# Patient Record
Sex: MALE | URBAN - METROPOLITAN AREA
[De-identification: names, ages, dates, MRNs, and addresses within clinical notes are randomized per-mention and may not be internally consistent; named-entity substitution may affect disease eponyms.]

---

## 2024-01-01 ENCOUNTER — APPOINTMENT (OUTPATIENT)
Dept: RADIOLOGY | Facility: MEDICAL CENTER | Age: 34
DRG: 955 | End: 2024-01-01
Attending: NURSE PRACTITIONER

## 2024-01-01 ENCOUNTER — HOSPITAL ENCOUNTER (OUTPATIENT)
Dept: RADIOLOGY | Facility: MEDICAL CENTER | Age: 34
End: 2024-11-15

## 2024-01-01 ENCOUNTER — APPOINTMENT (OUTPATIENT)
Dept: RADIOLOGY | Facility: MEDICAL CENTER | Age: 34
DRG: 955 | End: 2024-01-01
Attending: STUDENT IN AN ORGANIZED HEALTH CARE EDUCATION/TRAINING PROGRAM

## 2024-01-01 ENCOUNTER — HOSPITAL ENCOUNTER (INPATIENT)
Facility: MEDICAL CENTER | Age: 34
LOS: 6 days | DRG: 955 | End: 2024-11-21
Attending: EMERGENCY MEDICINE | Admitting: STUDENT IN AN ORGANIZED HEALTH CARE EDUCATION/TRAINING PROGRAM

## 2024-01-01 ENCOUNTER — APPOINTMENT (OUTPATIENT)
Dept: RADIOLOGY | Facility: MEDICAL CENTER | Age: 34
DRG: 955 | End: 2024-01-01

## 2024-01-01 ENCOUNTER — APPOINTMENT (OUTPATIENT)
Dept: RADIOLOGY | Facility: MEDICAL CENTER | Age: 34
DRG: 955 | End: 2024-01-01
Attending: EMERGENCY MEDICINE

## 2024-01-01 ENCOUNTER — APPOINTMENT (OUTPATIENT)
Dept: CARDIOLOGY | Facility: MEDICAL CENTER | Age: 34
DRG: 955 | End: 2024-01-01
Attending: STUDENT IN AN ORGANIZED HEALTH CARE EDUCATION/TRAINING PROGRAM

## 2024-01-01 VITALS
OXYGEN SATURATION: 83 % | SYSTOLIC BLOOD PRESSURE: 87 MMHG | BODY MASS INDEX: 31.89 KG/M2 | WEIGHT: 198.41 LBS | HEIGHT: 66 IN | DIASTOLIC BLOOD PRESSURE: 52 MMHG | RESPIRATION RATE: 12 BRPM | TEMPERATURE: 102 F

## 2024-01-01 DIAGNOSIS — S06.2XAA TRAUMATIC INTRACRANIAL HEMORRHAGE WITH UNKNOWN LOSS OF CONSCIOUSNESS STATUS, INITIAL ENCOUNTER (HCC): ICD-10-CM

## 2024-01-01 LAB
ABO + RH BLD: NORMAL
ABO GROUP BLD: NORMAL
ALBUMIN SERPL BCP-MCNC: 2.6 G/DL (ref 3.2–4.9)
ALBUMIN SERPL BCP-MCNC: 2.8 G/DL (ref 3.2–4.9)
ALBUMIN SERPL BCP-MCNC: 2.8 G/DL (ref 3.2–4.9)
ALBUMIN SERPL BCP-MCNC: 2.9 G/DL (ref 3.2–4.9)
ALBUMIN SERPL BCP-MCNC: 3.5 G/DL (ref 3.2–4.9)
ALBUMIN SERPL BCP-MCNC: 3.9 G/DL (ref 3.2–4.9)
ALBUMIN/GLOB SERPL: 0.7 G/DL
ALBUMIN/GLOB SERPL: 0.7 G/DL
ALBUMIN/GLOB SERPL: 0.8 G/DL
ALBUMIN/GLOB SERPL: 0.9 G/DL
ALBUMIN/GLOB SERPL: 0.9 G/DL
ALBUMIN/GLOB SERPL: 1.2 G/DL
ALBUMIN/GLOB SERPL: 1.3 G/DL
ALBUMIN/GLOB SERPL: 1.6 G/DL
ALP SERPL-CCNC: 102 U/L (ref 30–99)
ALP SERPL-CCNC: 121 U/L (ref 30–99)
ALP SERPL-CCNC: 122 U/L (ref 30–99)
ALP SERPL-CCNC: 168 U/L (ref 30–99)
ALP SERPL-CCNC: 77 U/L (ref 30–99)
ALP SERPL-CCNC: 81 U/L (ref 30–99)
ALP SERPL-CCNC: 87 U/L (ref 30–99)
ALP SERPL-CCNC: 97 U/L (ref 30–99)
ALT SERPL-CCNC: 102 U/L (ref 2–50)
ALT SERPL-CCNC: 47 U/L (ref 2–50)
ALT SERPL-CCNC: 48 U/L (ref 2–50)
ALT SERPL-CCNC: 53 U/L (ref 2–50)
ALT SERPL-CCNC: 59 U/L (ref 2–50)
ALT SERPL-CCNC: 60 U/L (ref 2–50)
ALT SERPL-CCNC: 73 U/L (ref 2–50)
ALT SERPL-CCNC: 87 U/L (ref 2–50)
AMPHET UR QL SCN: NEGATIVE
ANION GAP SERPL CALC-SCNC: 10 MMOL/L (ref 7–16)
ANION GAP SERPL CALC-SCNC: 11 MMOL/L (ref 7–16)
ANION GAP SERPL CALC-SCNC: 11 MMOL/L (ref 7–16)
ANION GAP SERPL CALC-SCNC: 15 MMOL/L (ref 7–16)
ANION GAP SERPL CALC-SCNC: 5 MMOL/L (ref 7–16)
ANION GAP SERPL CALC-SCNC: 7 MMOL/L (ref 7–16)
ANION GAP SERPL CALC-SCNC: 8 MMOL/L (ref 7–16)
ANION GAP SERPL CALC-SCNC: 9 MMOL/L (ref 7–16)
ANISOCYTOSIS BLD QL SMEAR: ABNORMAL
APTT PPP: 26.9 SEC (ref 24.7–36)
APTT PPP: 29.5 SEC (ref 24.7–36)
ARTERIAL PATENCY WRIST A: ABNORMAL
AST SERPL-CCNC: 122 U/L (ref 12–45)
AST SERPL-CCNC: 158 U/L (ref 12–45)
AST SERPL-CCNC: 174 U/L (ref 12–45)
AST SERPL-CCNC: 36 U/L (ref 12–45)
AST SERPL-CCNC: 44 U/L (ref 12–45)
AST SERPL-CCNC: 65 U/L (ref 12–45)
AST SERPL-CCNC: 72 U/L (ref 12–45)
AST SERPL-CCNC: 73 U/L (ref 12–45)
BARBITURATES UR QL SCN: NEGATIVE
BASE EXCESS BLDA CALC-SCNC: -1 MMOL/L (ref -4–3)
BASE EXCESS BLDA CALC-SCNC: -10 MMOL/L (ref -4–3)
BASE EXCESS BLDA CALC-SCNC: -2 MMOL/L (ref -4–3)
BASE EXCESS BLDA CALC-SCNC: -2 MMOL/L (ref -4–3)
BASE EXCESS BLDA CALC-SCNC: -3 MMOL/L (ref -4–3)
BASE EXCESS BLDA CALC-SCNC: -6 MMOL/L (ref -4–3)
BASE EXCESS BLDA CALC-SCNC: -7 MMOL/L (ref -4–3)
BASE EXCESS BLDA CALC-SCNC: 0 MMOL/L (ref -4–3)
BASE EXCESS BLDA CALC-SCNC: 0 MMOL/L (ref -4–3)
BASOPHILS # BLD AUTO: 0 % (ref 0–1.8)
BASOPHILS # BLD AUTO: 0 % (ref 0–1.8)
BASOPHILS # BLD AUTO: 0.1 % (ref 0–1.8)
BASOPHILS # BLD AUTO: 0.2 % (ref 0–1.8)
BASOPHILS # BLD AUTO: 0.9 % (ref 0–1.8)
BASOPHILS # BLD AUTO: 0.9 % (ref 0–1.8)
BASOPHILS # BLD AUTO: 2.5 % (ref 0–1.8)
BASOPHILS # BLD: 0 K/UL (ref 0–0.12)
BASOPHILS # BLD: 0 K/UL (ref 0–0.12)
BASOPHILS # BLD: 0.02 K/UL (ref 0–0.12)
BASOPHILS # BLD: 0.02 K/UL (ref 0–0.12)
BASOPHILS # BLD: 0.08 K/UL (ref 0–0.12)
BASOPHILS # BLD: 0.1 K/UL (ref 0–0.12)
BASOPHILS # BLD: 0.19 K/UL (ref 0–0.12)
BENZODIAZ UR QL SCN: POSITIVE
BILIRUB SERPL-MCNC: 0.5 MG/DL (ref 0.1–1.5)
BILIRUB SERPL-MCNC: 0.6 MG/DL (ref 0.1–1.5)
BILIRUB SERPL-MCNC: 0.6 MG/DL (ref 0.1–1.5)
BILIRUB SERPL-MCNC: 0.7 MG/DL (ref 0.1–1.5)
BILIRUB SERPL-MCNC: 0.7 MG/DL (ref 0.1–1.5)
BILIRUB SERPL-MCNC: 1.5 MG/DL (ref 0.1–1.5)
BILIRUB SERPL-MCNC: 1.8 MG/DL (ref 0.1–1.5)
BILIRUB SERPL-MCNC: 3.2 MG/DL (ref 0.1–1.5)
BLD GP AB SCN SERPL QL: NORMAL
BODY TEMPERATURE: 37.7 CENTIGRADE
BODY TEMPERATURE: ABNORMAL DEGREES
BREATHS SETTING VENT: 22
BREATHS SETTING VENT: 32
BREATHS SETTING VENT: 32
BREATHS SETTING VENT: 34
BUN SERPL-MCNC: 10 MG/DL (ref 8–22)
BUN SERPL-MCNC: 11 MG/DL (ref 8–22)
BUN SERPL-MCNC: 13 MG/DL (ref 8–22)
BUN SERPL-MCNC: 14 MG/DL (ref 8–22)
BUN SERPL-MCNC: 15 MG/DL (ref 8–22)
BUN SERPL-MCNC: 16 MG/DL (ref 8–22)
BUN SERPL-MCNC: 16 MG/DL (ref 8–22)
BUN SERPL-MCNC: 18 MG/DL (ref 8–22)
BUN SERPL-MCNC: 19 MG/DL (ref 8–22)
BUN SERPL-MCNC: 19 MG/DL (ref 8–22)
BUN SERPL-MCNC: 20 MG/DL (ref 8–22)
BUN SERPL-MCNC: 22 MG/DL (ref 8–22)
BUN SERPL-MCNC: 22 MG/DL (ref 8–22)
BUN SERPL-MCNC: 23 MG/DL (ref 8–22)
BUN SERPL-MCNC: 23 MG/DL (ref 8–22)
BUN SERPL-MCNC: 25 MG/DL (ref 8–22)
BUN SERPL-MCNC: 25 MG/DL (ref 8–22)
BUN SERPL-MCNC: 26 MG/DL (ref 8–22)
BUN SERPL-MCNC: 27 MG/DL (ref 8–22)
BUN SERPL-MCNC: 28 MG/DL (ref 8–22)
BUN SERPL-MCNC: 32 MG/DL (ref 8–22)
BUN SERPL-MCNC: 39 MG/DL (ref 8–22)
BUN SERPL-MCNC: 40 MG/DL (ref 8–22)
BUN SERPL-MCNC: 6 MG/DL (ref 8–22)
BUN SERPL-MCNC: 7 MG/DL (ref 8–22)
BUN SERPL-MCNC: 9 MG/DL (ref 8–22)
BZE UR QL SCN: NEGATIVE
CA-I BLD ISE-SCNC: 1.33 MMOL/L (ref 1.1–1.3)
CALCIUM ALBUM COR SERPL-MCNC: 10 MG/DL (ref 8.5–10.5)
CALCIUM ALBUM COR SERPL-MCNC: 10.4 MG/DL (ref 8.5–10.5)
CALCIUM ALBUM COR SERPL-MCNC: 8.5 MG/DL (ref 8.5–10.5)
CALCIUM ALBUM COR SERPL-MCNC: 8.5 MG/DL (ref 8.5–10.5)
CALCIUM ALBUM COR SERPL-MCNC: 9 MG/DL (ref 8.5–10.5)
CALCIUM ALBUM COR SERPL-MCNC: 9.1 MG/DL (ref 8.5–10.5)
CALCIUM ALBUM COR SERPL-MCNC: 9.5 MG/DL (ref 8.5–10.5)
CALCIUM ALBUM COR SERPL-MCNC: 9.6 MG/DL (ref 8.5–10.5)
CALCIUM SERPL-MCNC: 7.4 MG/DL (ref 8.5–10.5)
CALCIUM SERPL-MCNC: 7.4 MG/DL (ref 8.5–10.5)
CALCIUM SERPL-MCNC: 7.7 MG/DL (ref 8.5–10.5)
CALCIUM SERPL-MCNC: 7.7 MG/DL (ref 8.5–10.5)
CALCIUM SERPL-MCNC: 7.8 MG/DL (ref 8.5–10.5)
CALCIUM SERPL-MCNC: 7.8 MG/DL (ref 8.5–10.5)
CALCIUM SERPL-MCNC: 7.9 MG/DL (ref 8.5–10.5)
CALCIUM SERPL-MCNC: 8 MG/DL (ref 8.5–10.5)
CALCIUM SERPL-MCNC: 8.1 MG/DL (ref 8.5–10.5)
CALCIUM SERPL-MCNC: 8.1 MG/DL (ref 8.5–10.5)
CALCIUM SERPL-MCNC: 8.2 MG/DL (ref 8.5–10.5)
CALCIUM SERPL-MCNC: 8.3 MG/DL (ref 8.5–10.5)
CALCIUM SERPL-MCNC: 8.4 MG/DL (ref 8.5–10.5)
CALCIUM SERPL-MCNC: 8.5 MG/DL (ref 8.5–10.5)
CALCIUM SERPL-MCNC: 8.6 MG/DL (ref 8.5–10.5)
CALCIUM SERPL-MCNC: 8.7 MG/DL (ref 8.5–10.5)
CALCIUM SERPL-MCNC: 8.8 MG/DL (ref 8.5–10.5)
CALCIUM SERPL-MCNC: 9 MG/DL (ref 8.5–10.5)
CALCIUM SERPL-MCNC: 9.1 MG/DL (ref 8.5–10.5)
CALCIUM SERPL-MCNC: 9.3 MG/DL (ref 8.5–10.5)
CALCIUM SERPL-MCNC: 9.5 MG/DL (ref 8.5–10.5)
CANNABINOIDS UR QL SCN: NEGATIVE
CFT BLD TEG: 4.7 MIN (ref 4.6–9.1)
CFT BLD TEG: 9.9 MIN (ref 4.6–9.1)
CFT P HPASE BLD TEG: 5.1 MIN (ref 4.3–8.3)
CFT P HPASE BLD TEG: 8.9 MIN (ref 4.3–8.3)
CHLORIDE SERPL-SCNC: 105 MMOL/L (ref 96–112)
CHLORIDE SERPL-SCNC: 112 MMOL/L (ref 96–112)
CHLORIDE SERPL-SCNC: 114 MMOL/L (ref 96–112)
CHLORIDE SERPL-SCNC: 115 MMOL/L (ref 96–112)
CHLORIDE SERPL-SCNC: 118 MMOL/L (ref 96–112)
CHLORIDE SERPL-SCNC: 118 MMOL/L (ref 96–112)
CHLORIDE SERPL-SCNC: 119 MMOL/L (ref 96–112)
CHLORIDE SERPL-SCNC: 120 MMOL/L (ref 96–112)
CHLORIDE SERPL-SCNC: 121 MMOL/L (ref 96–112)
CHLORIDE SERPL-SCNC: 122 MMOL/L (ref 96–112)
CHLORIDE SERPL-SCNC: 123 MMOL/L (ref 96–112)
CHLORIDE SERPL-SCNC: 124 MMOL/L (ref 96–112)
CHLORIDE SERPL-SCNC: 125 MMOL/L (ref 96–112)
CHLORIDE SERPL-SCNC: 126 MMOL/L (ref 96–112)
CHLORIDE SERPL-SCNC: 127 MMOL/L (ref 96–112)
CHLORIDE SERPL-SCNC: 127 MMOL/L (ref 96–112)
CHLORIDE SERPL-SCNC: 128 MMOL/L (ref 96–112)
CLOT ANGLE BLD TEG: 72.8 DEGREES (ref 63–78)
CLOT ANGLE BLD TEG: 72.9 DEGREES (ref 63–78)
CLOT LYSIS 30M P MA LENFR BLD TEG: 0 % (ref 0–2.6)
CO2 BLDA-SCNC: 20 MMOL/L (ref 20–33)
CO2 BLDA-SCNC: 20 MMOL/L (ref 20–33)
CO2 BLDA-SCNC: 23 MMOL/L (ref 32–48)
CO2 BLDA-SCNC: 25 MMOL/L (ref 32–48)
CO2 BLDA-SCNC: 26 MMOL/L (ref 32–48)
CO2 BLDA-SCNC: 28 MMOL/L (ref 32–48)
CO2 BLDA-SCNC: 29 MMOL/L (ref 32–48)
CO2 BLDA-SCNC: 30 MMOL/L (ref 32–48)
CO2 BLDA-SCNC: 30 MMOL/L (ref 32–48)
CO2 SERPL-SCNC: 17 MMOL/L (ref 20–33)
CO2 SERPL-SCNC: 17 MMOL/L (ref 20–33)
CO2 SERPL-SCNC: 18 MMOL/L (ref 20–33)
CO2 SERPL-SCNC: 19 MMOL/L (ref 20–33)
CO2 SERPL-SCNC: 20 MMOL/L (ref 20–33)
CO2 SERPL-SCNC: 21 MMOL/L (ref 20–33)
CO2 SERPL-SCNC: 22 MMOL/L (ref 20–33)
CO2 SERPL-SCNC: 23 MMOL/L (ref 20–33)
CO2 SERPL-SCNC: 24 MMOL/L (ref 20–33)
CO2 SERPL-SCNC: 24 MMOL/L (ref 20–33)
CO2 SERPL-SCNC: 26 MMOL/L (ref 20–33)
COMPONENT CELLULAR 8504CLL: NORMAL
CORTIS SERPL-MCNC: 32.1 UG/DL (ref 0–23)
CREAT SERPL-MCNC: 0.24 MG/DL (ref 0.5–1.4)
CREAT SERPL-MCNC: 0.3 MG/DL (ref 0.5–1.4)
CREAT SERPL-MCNC: 0.32 MG/DL (ref 0.5–1.4)
CREAT SERPL-MCNC: 0.39 MG/DL (ref 0.5–1.4)
CREAT SERPL-MCNC: 0.4 MG/DL (ref 0.5–1.4)
CREAT SERPL-MCNC: 0.43 MG/DL (ref 0.5–1.4)
CREAT SERPL-MCNC: 0.43 MG/DL (ref 0.5–1.4)
CREAT SERPL-MCNC: 0.46 MG/DL (ref 0.5–1.4)
CREAT SERPL-MCNC: 0.47 MG/DL (ref 0.5–1.4)
CREAT SERPL-MCNC: 0.49 MG/DL (ref 0.5–1.4)
CREAT SERPL-MCNC: 0.5 MG/DL (ref 0.5–1.4)
CREAT SERPL-MCNC: 0.54 MG/DL (ref 0.5–1.4)
CREAT SERPL-MCNC: 0.55 MG/DL (ref 0.5–1.4)
CREAT SERPL-MCNC: 0.55 MG/DL (ref 0.5–1.4)
CREAT SERPL-MCNC: 0.58 MG/DL (ref 0.5–1.4)
CREAT SERPL-MCNC: 0.62 MG/DL (ref 0.5–1.4)
CREAT SERPL-MCNC: 0.62 MG/DL (ref 0.5–1.4)
CREAT SERPL-MCNC: 0.64 MG/DL (ref 0.5–1.4)
CREAT SERPL-MCNC: 0.66 MG/DL (ref 0.5–1.4)
CREAT SERPL-MCNC: 0.68 MG/DL (ref 0.5–1.4)
CREAT SERPL-MCNC: 0.69 MG/DL (ref 0.5–1.4)
CREAT SERPL-MCNC: 0.77 MG/DL (ref 0.5–1.4)
CREAT SERPL-MCNC: 0.84 MG/DL (ref 0.5–1.4)
CREAT SERPL-MCNC: 0.97 MG/DL (ref 0.5–1.4)
CREAT SERPL-MCNC: 1.07 MG/DL (ref 0.5–1.4)
CREAT SERPL-MCNC: 1.09 MG/DL (ref 0.5–1.4)
CRP SERPL HS-MCNC: 18.52 MG/DL (ref 0–0.75)
CRP SERPL HS-MCNC: 20.5 MG/DL (ref 0–0.75)
CT.EXTRINSIC BLD ROTEM: 1.3 MIN (ref 0.8–2.1)
CT.EXTRINSIC BLD ROTEM: 1.3 MIN (ref 0.8–2.1)
DELSYS IDSYS: ABNORMAL
EKG IMPRESSION: NORMAL
END TIDAL CARBON DIOXIDE IECO2: 30 MMHG
END TIDAL CARBON DIOXIDE IECO2: 38 MMHG
END TIDAL CARBON DIOXIDE IECO2: 38 MMHG
END TIDAL CARBON DIOXIDE IECO2: 40 MMHG
END TIDAL CARBON DIOXIDE IECO2: 40 MMHG
END TIDAL CARBON DIOXIDE IECO2: 41 MMHG
END TIDAL CARBON DIOXIDE IECO2: 44 MMHG
END TIDAL CARBON DIOXIDE IECO2: 44 MMHG
END TIDAL CARBON DIOXIDE IECO2: 46 MMHG
END TIDAL CARBON DIOXIDE IECO2: 46 MMHG
END TIDAL CARBON DIOXIDE IECO2: 47 MMHG
END TIDAL CARBON DIOXIDE IECO2: 49 MMHG
END TIDAL CARBON DIOXIDE IECO2: 49 MMHG
EOSINOPHIL # BLD AUTO: 0.01 K/UL (ref 0–0.51)
EOSINOPHIL # BLD AUTO: 0.03 K/UL (ref 0–0.51)
EOSINOPHIL # BLD AUTO: 0.09 K/UL (ref 0–0.51)
EOSINOPHIL # BLD AUTO: 0.12 K/UL (ref 0–0.51)
EOSINOPHIL # BLD AUTO: 0.15 K/UL (ref 0–0.51)
EOSINOPHIL # BLD AUTO: 0.4 K/UL (ref 0–0.51)
EOSINOPHIL # BLD AUTO: 0.48 K/UL (ref 0–0.51)
EOSINOPHIL NFR BLD: 0.1 % (ref 0–6.9)
EOSINOPHIL NFR BLD: 0.3 % (ref 0–6.9)
EOSINOPHIL NFR BLD: 0.8 % (ref 0–6.9)
EOSINOPHIL NFR BLD: 1.6 % (ref 0–6.9)
EOSINOPHIL NFR BLD: 1.7 % (ref 0–6.9)
EOSINOPHIL NFR BLD: 4.3 % (ref 0–6.9)
EOSINOPHIL NFR BLD: 5.2 % (ref 0–6.9)
ERYTHROCYTE [DISTWIDTH] IN BLOOD BY AUTOMATED COUNT: 38.8 FL (ref 35.9–50)
ERYTHROCYTE [DISTWIDTH] IN BLOOD BY AUTOMATED COUNT: 39.1 FL (ref 35.9–50)
ERYTHROCYTE [DISTWIDTH] IN BLOOD BY AUTOMATED COUNT: 44.6 FL (ref 35.9–50)
ERYTHROCYTE [DISTWIDTH] IN BLOOD BY AUTOMATED COUNT: 44.7 FL (ref 35.9–50)
ERYTHROCYTE [DISTWIDTH] IN BLOOD BY AUTOMATED COUNT: 47.3 FL (ref 35.9–50)
ERYTHROCYTE [DISTWIDTH] IN BLOOD BY AUTOMATED COUNT: 51.1 FL (ref 35.9–50)
ERYTHROCYTE [DISTWIDTH] IN BLOOD BY AUTOMATED COUNT: 51.9 FL (ref 35.9–50)
ERYTHROCYTE [DISTWIDTH] IN BLOOD BY AUTOMATED COUNT: 52.1 FL (ref 35.9–50)
ETHANOL BLD-MCNC: <10.1 MG/DL
FENTANYL UR QL: POSITIVE
FIBRINOGEN PPP-MCNC: >1000 MG/DL (ref 215–460)
GFR SERPLBLD CREATININE-BSD FMLA CKD-EPI: 104 ML/MIN/1.73 M 2
GFR SERPLBLD CREATININE-BSD FMLA CKD-EPI: 116 ML/MIN/1.73 M 2
GFR SERPLBLD CREATININE-BSD FMLA CKD-EPI: 120 ML/MIN/1.73 M 2
GFR SERPLBLD CREATININE-BSD FMLA CKD-EPI: 124 ML/MIN/1.73 M 2
GFR SERPLBLD CREATININE-BSD FMLA CKD-EPI: 124 ML/MIN/1.73 M 2
GFR SERPLBLD CREATININE-BSD FMLA CKD-EPI: 125 ML/MIN/1.73 M 2
GFR SERPLBLD CREATININE-BSD FMLA CKD-EPI: 126 ML/MIN/1.73 M 2
GFR SERPLBLD CREATININE-BSD FMLA CKD-EPI: 128 ML/MIN/1.73 M 2
GFR SERPLBLD CREATININE-BSD FMLA CKD-EPI: 128 ML/MIN/1.73 M 2
GFR SERPLBLD CREATININE-BSD FMLA CKD-EPI: 130 ML/MIN/1.73 M 2
GFR SERPLBLD CREATININE-BSD FMLA CKD-EPI: 132 ML/MIN/1.73 M 2
GFR SERPLBLD CREATININE-BSD FMLA CKD-EPI: 132 ML/MIN/1.73 M 2
GFR SERPLBLD CREATININE-BSD FMLA CKD-EPI: 133 ML/MIN/1.73 M 2
GFR SERPLBLD CREATININE-BSD FMLA CKD-EPI: 136 ML/MIN/1.73 M 2
GFR SERPLBLD CREATININE-BSD FMLA CKD-EPI: 137 ML/MIN/1.73 M 2
GFR SERPLBLD CREATININE-BSD FMLA CKD-EPI: 139 ML/MIN/1.73 M 2
GFR SERPLBLD CREATININE-BSD FMLA CKD-EPI: 140 ML/MIN/1.73 M 2
GFR SERPLBLD CREATININE-BSD FMLA CKD-EPI: 143 ML/MIN/1.73 M 2
GFR SERPLBLD CREATININE-BSD FMLA CKD-EPI: 143 ML/MIN/1.73 M 2
GFR SERPLBLD CREATININE-BSD FMLA CKD-EPI: 146 ML/MIN/1.73 M 2
GFR SERPLBLD CREATININE-BSD FMLA CKD-EPI: 147 ML/MIN/1.73 M 2
GFR SERPLBLD CREATININE-BSD FMLA CKD-EPI: 156 ML/MIN/1.73 M 2
GFR SERPLBLD CREATININE-BSD FMLA CKD-EPI: 159 ML/MIN/1.73 M 2
GFR SERPLBLD CREATININE-BSD FMLA CKD-EPI: 170 ML/MIN/1.73 M 2
GFR SERPLBLD CREATININE-BSD FMLA CKD-EPI: 91 ML/MIN/1.73 M 2
GFR SERPLBLD CREATININE-BSD FMLA CKD-EPI: 93 ML/MIN/1.73 M 2
GLOBULIN SER CALC-MCNC: 2.2 G/DL (ref 1.9–3.5)
GLOBULIN SER CALC-MCNC: 2.5 G/DL (ref 1.9–3.5)
GLOBULIN SER CALC-MCNC: 3 G/DL (ref 1.9–3.5)
GLOBULIN SER CALC-MCNC: 3.1 G/DL (ref 1.9–3.5)
GLOBULIN SER CALC-MCNC: 3.2 G/DL (ref 1.9–3.5)
GLOBULIN SER CALC-MCNC: 3.6 G/DL (ref 1.9–3.5)
GLOBULIN SER CALC-MCNC: 3.7 G/DL (ref 1.9–3.5)
GLOBULIN SER CALC-MCNC: 3.9 G/DL (ref 1.9–3.5)
GLUCOSE BLD STRIP.AUTO-MCNC: 122 MG/DL (ref 65–99)
GLUCOSE BLD STRIP.AUTO-MCNC: 129 MG/DL (ref 65–99)
GLUCOSE BLD STRIP.AUTO-MCNC: 132 MG/DL (ref 65–99)
GLUCOSE BLD STRIP.AUTO-MCNC: 136 MG/DL (ref 65–99)
GLUCOSE BLD STRIP.AUTO-MCNC: 137 MG/DL (ref 65–99)
GLUCOSE BLD STRIP.AUTO-MCNC: 138 MG/DL (ref 65–99)
GLUCOSE BLD STRIP.AUTO-MCNC: 138 MG/DL (ref 65–99)
GLUCOSE BLD STRIP.AUTO-MCNC: 143 MG/DL (ref 65–99)
GLUCOSE BLD STRIP.AUTO-MCNC: 144 MG/DL (ref 65–99)
GLUCOSE BLD STRIP.AUTO-MCNC: 146 MG/DL (ref 65–99)
GLUCOSE BLD STRIP.AUTO-MCNC: 147 MG/DL (ref 65–99)
GLUCOSE BLD STRIP.AUTO-MCNC: 159 MG/DL (ref 65–99)
GLUCOSE BLD STRIP.AUTO-MCNC: 161 MG/DL (ref 65–99)
GLUCOSE BLD STRIP.AUTO-MCNC: 162 MG/DL (ref 65–99)
GLUCOSE BLD STRIP.AUTO-MCNC: 163 MG/DL (ref 65–99)
GLUCOSE BLD STRIP.AUTO-MCNC: 167 MG/DL (ref 65–99)
GLUCOSE BLD STRIP.AUTO-MCNC: 169 MG/DL (ref 65–99)
GLUCOSE BLD STRIP.AUTO-MCNC: 196 MG/DL (ref 65–99)
GLUCOSE BLD STRIP.AUTO-MCNC: 215 MG/DL (ref 65–99)
GLUCOSE SERPL-MCNC: 127 MG/DL (ref 65–99)
GLUCOSE SERPL-MCNC: 136 MG/DL (ref 65–99)
GLUCOSE SERPL-MCNC: 140 MG/DL (ref 65–99)
GLUCOSE SERPL-MCNC: 141 MG/DL (ref 65–99)
GLUCOSE SERPL-MCNC: 142 MG/DL (ref 65–99)
GLUCOSE SERPL-MCNC: 144 MG/DL (ref 65–99)
GLUCOSE SERPL-MCNC: 152 MG/DL (ref 65–99)
GLUCOSE SERPL-MCNC: 156 MG/DL (ref 65–99)
GLUCOSE SERPL-MCNC: 160 MG/DL (ref 65–99)
GLUCOSE SERPL-MCNC: 161 MG/DL (ref 65–99)
GLUCOSE SERPL-MCNC: 164 MG/DL (ref 65–99)
GLUCOSE SERPL-MCNC: 165 MG/DL (ref 65–99)
GLUCOSE SERPL-MCNC: 171 MG/DL (ref 65–99)
GLUCOSE SERPL-MCNC: 176 MG/DL (ref 65–99)
GLUCOSE SERPL-MCNC: 177 MG/DL (ref 65–99)
GLUCOSE SERPL-MCNC: 178 MG/DL (ref 65–99)
GLUCOSE SERPL-MCNC: 180 MG/DL (ref 65–99)
GLUCOSE SERPL-MCNC: 181 MG/DL (ref 65–99)
GLUCOSE SERPL-MCNC: 188 MG/DL (ref 65–99)
GLUCOSE SERPL-MCNC: 192 MG/DL (ref 65–99)
GLUCOSE SERPL-MCNC: 192 MG/DL (ref 65–99)
GLUCOSE SERPL-MCNC: 200 MG/DL (ref 65–99)
GLUCOSE SERPL-MCNC: 202 MG/DL (ref 65–99)
GLUCOSE SERPL-MCNC: 215 MG/DL (ref 65–99)
GLUCOSE SERPL-MCNC: 224 MG/DL (ref 65–99)
GLUCOSE SERPL-MCNC: 285 MG/DL (ref 65–99)
HCO3 BLDA-SCNC: 16 MMOL/L (ref 17–25)
HCO3 BLDA-SCNC: 18.6 MMOL/L (ref 17–25)
HCO3 BLDA-SCNC: 19.1 MMOL/L (ref 17–25)
HCO3 BLDA-SCNC: 21.9 MMOL/L (ref 21–28)
HCO3 BLDA-SCNC: 24.2 MMOL/L (ref 21–28)
HCO3 BLDA-SCNC: 24.6 MMOL/L (ref 21–28)
HCO3 BLDA-SCNC: 26.2 MMOL/L (ref 21–28)
HCO3 BLDA-SCNC: 26.6 MMOL/L (ref 21–28)
HCO3 BLDA-SCNC: 26.8 MMOL/L (ref 21–28)
HCO3 BLDA-SCNC: 26.9 MMOL/L (ref 21–28)
HCO3 BLDA-SCNC: 27.1 MMOL/L (ref 21–28)
HCO3 BLDA-SCNC: 27.5 MMOL/L (ref 21–28)
HCO3 BLDA-SCNC: 27.6 MMOL/L (ref 21–28)
HCO3 BLDA-SCNC: 27.9 MMOL/L (ref 21–28)
HCT VFR BLD AUTO: 22.8 % (ref 42–52)
HCT VFR BLD AUTO: 24.5 % (ref 42–52)
HCT VFR BLD AUTO: 25.5 % (ref 42–52)
HCT VFR BLD AUTO: 25.5 % (ref 42–52)
HCT VFR BLD AUTO: 26.1 % (ref 42–52)
HCT VFR BLD AUTO: 27 % (ref 42–52)
HCT VFR BLD AUTO: 34.3 % (ref 42–52)
HCT VFR BLD AUTO: 44.3 % (ref 42–52)
HGB BLD-MCNC: 11.2 G/DL (ref 14–18)
HGB BLD-MCNC: 11.5 G/DL (ref 14–18)
HGB BLD-MCNC: 15.3 G/DL (ref 14–18)
HGB BLD-MCNC: 7.6 G/DL (ref 14–18)
HGB BLD-MCNC: 7.9 G/DL (ref 14–18)
HGB BLD-MCNC: 8.1 G/DL (ref 14–18)
HGB BLD-MCNC: 8.2 G/DL (ref 14–18)
HGB BLD-MCNC: 8.3 G/DL (ref 14–18)
HGB BLD-MCNC: 8.6 G/DL (ref 14–18)
HOLDING TUBE BB 8507: NORMAL
HOROWITZ INDEX BLDA+IHG-RTO: 113 MM[HG]
HOROWITZ INDEX BLDA+IHG-RTO: 132 MM[HG]
HOROWITZ INDEX BLDA+IHG-RTO: 220 MM[HG]
HOROWITZ INDEX BLDA+IHG-RTO: 51 MM[HG]
HOROWITZ INDEX BLDA+IHG-RTO: 56 MM[HG]
HOROWITZ INDEX BLDA+IHG-RTO: 61 MM[HG]
HOROWITZ INDEX BLDA+IHG-RTO: 62 MM[HG]
HOROWITZ INDEX BLDA+IHG-RTO: 70 MM[HG]
HOROWITZ INDEX BLDA+IHG-RTO: 74 MM[HG]
HOROWITZ INDEX BLDA+IHG-RTO: 76 MM[HG]
HOROWITZ INDEX BLDA+IHG-RTO: 93 MM[HG]
HOROWITZ INDEX BLDA+IHG-RTO: 98 MM[HG]
IMM GRANULOCYTES # BLD AUTO: 0.08 K/UL (ref 0–0.11)
IMM GRANULOCYTES # BLD AUTO: 0.15 K/UL (ref 0–0.11)
IMM GRANULOCYTES NFR BLD AUTO: 0.7 % (ref 0–0.9)
IMM GRANULOCYTES NFR BLD AUTO: 1 % (ref 0–0.9)
INHALED O2 FLOW RATE: 100 L/MIN
INR PPP: 1.21 (ref 0.87–1.13)
INR PPP: 1.32 (ref 0.87–1.13)
LACTATE BLD-SCNC: 0.6 MMOL/L (ref 0.5–2)
LACTATE BLD-SCNC: 0.6 MMOL/L (ref 0.5–2)
LACTATE BLD-SCNC: 0.8 MMOL/L (ref 0.5–2)
LACTATE BLD-SCNC: 1 MMOL/L (ref 0.5–2)
LACTATE BLD-SCNC: 1.1 MMOL/L (ref 0.5–2)
LACTATE BLD-SCNC: 1.2 MMOL/L (ref 0.5–2)
LACTATE BLD-SCNC: 1.8 MMOL/L (ref 0.5–2)
LACTATE SERPL-SCNC: 2.2 MMOL/L (ref 0.5–2)
LACTATE SERPL-SCNC: 2.3 MMOL/L (ref 0.5–2)
LACTATE SERPL-SCNC: 4 MMOL/L (ref 0.5–2)
LPM ILPM: 15 LPM
LV EJECT FRACT MOD 2C 99903: 60.79
LV EJECT FRACT MOD 4C 99902: 59.63
LV EJECT FRACT MOD BP 99901: 61.98
LYMPHOCYTES # BLD AUTO: 0.97 K/UL (ref 1–4.8)
LYMPHOCYTES # BLD AUTO: 1.71 K/UL (ref 1–4.8)
LYMPHOCYTES # BLD AUTO: 1.78 K/UL (ref 1–4.8)
LYMPHOCYTES # BLD AUTO: 1.81 K/UL (ref 1–4.8)
LYMPHOCYTES # BLD AUTO: 2.02 K/UL (ref 1–4.8)
LYMPHOCYTES # BLD AUTO: 2.02 K/UL (ref 1–4.8)
LYMPHOCYTES # BLD AUTO: 2.51 K/UL (ref 1–4.8)
LYMPHOCYTES NFR BLD: 13.3 % (ref 22–41)
LYMPHOCYTES NFR BLD: 14.9 % (ref 22–41)
LYMPHOCYTES NFR BLD: 22.6 % (ref 22–41)
LYMPHOCYTES NFR BLD: 22.9 % (ref 22–41)
LYMPHOCYTES NFR BLD: 23.1 % (ref 22–41)
LYMPHOCYTES NFR BLD: 23.5 % (ref 22–41)
LYMPHOCYTES NFR BLD: 8.5 % (ref 22–41)
MAGNESIUM SERPL-MCNC: 2.2 MG/DL (ref 1.5–2.5)
MAGNESIUM SERPL-MCNC: 2.8 MG/DL (ref 1.5–2.5)
MAGNESIUM SERPL-MCNC: 2.8 MG/DL (ref 1.5–2.5)
MAGNESIUM SERPL-MCNC: 2.9 MG/DL (ref 1.5–2.5)
MAGNESIUM SERPL-MCNC: 2.9 MG/DL (ref 1.5–2.5)
MANUAL DIFF BLD: NORMAL
MCF BLD TEG: 62.8 MM (ref 52–69)
MCF BLD TEG: 69.2 MM (ref 52–69)
MCF.PLATELET INHIB BLD ROTEM: 19.1 MM (ref 15–32)
MCF.PLATELET INHIB BLD ROTEM: >52 MM (ref 15–32)
MCH RBC QN AUTO: 26.5 PG (ref 27–33)
MCH RBC QN AUTO: 26.7 PG (ref 27–33)
MCH RBC QN AUTO: 27 PG (ref 27–33)
MCH RBC QN AUTO: 27.2 PG (ref 27–33)
MCH RBC QN AUTO: 27.2 PG (ref 27–33)
MCH RBC QN AUTO: 27.3 PG (ref 27–33)
MCH RBC QN AUTO: 27.6 PG (ref 27–33)
MCH RBC QN AUTO: 27.6 PG (ref 27–33)
MCHC RBC AUTO-ENTMCNC: 31 G/DL (ref 32.3–36.5)
MCHC RBC AUTO-ENTMCNC: 31.4 G/DL (ref 32.3–36.5)
MCHC RBC AUTO-ENTMCNC: 31.9 G/DL (ref 32.3–36.5)
MCHC RBC AUTO-ENTMCNC: 32.5 G/DL (ref 32.3–36.5)
MCHC RBC AUTO-ENTMCNC: 33.1 G/DL (ref 32.3–36.5)
MCHC RBC AUTO-ENTMCNC: 33.3 G/DL (ref 32.3–36.5)
MCHC RBC AUTO-ENTMCNC: 33.5 G/DL (ref 32.3–36.5)
MCHC RBC AUTO-ENTMCNC: 34.5 G/DL (ref 32.3–36.5)
MCV RBC AUTO: 79.6 FL (ref 81.4–97.8)
MCV RBC AUTO: 79.8 FL (ref 81.4–97.8)
MCV RBC AUTO: 82.2 FL (ref 81.4–97.8)
MCV RBC AUTO: 82.9 FL (ref 81.4–97.8)
MCV RBC AUTO: 83.1 FL (ref 81.4–97.8)
MCV RBC AUTO: 85.6 FL (ref 81.4–97.8)
MCV RBC AUTO: 85.7 FL (ref 81.4–97.8)
MCV RBC AUTO: 86.7 FL (ref 81.4–97.8)
METAMYELOCYTES NFR BLD MANUAL: 1.7 %
METAMYELOCYTES NFR BLD MANUAL: 2.5 %
METHADONE UR QL SCN: NEGATIVE
MICROCYTES BLD QL SMEAR: ABNORMAL
MODE IMODE: ABNORMAL
MONOCYTES # BLD AUTO: 0.1 K/UL (ref 0–0.85)
MONOCYTES # BLD AUTO: 0.25 K/UL (ref 0–0.85)
MONOCYTES # BLD AUTO: 0.33 K/UL (ref 0–0.85)
MONOCYTES # BLD AUTO: 0.37 K/UL (ref 0–0.85)
MONOCYTES # BLD AUTO: 0.78 K/UL (ref 0–0.85)
MONOCYTES # BLD AUTO: 0.96 K/UL (ref 0–0.85)
MONOCYTES # BLD AUTO: 1.27 K/UL (ref 0–0.85)
MONOCYTES NFR BLD AUTO: 0.9 % (ref 0–13.4)
MONOCYTES NFR BLD AUTO: 3.3 % (ref 0–13.4)
MONOCYTES NFR BLD AUTO: 4.2 % (ref 0–13.4)
MONOCYTES NFR BLD AUTO: 4.3 % (ref 0–13.4)
MONOCYTES NFR BLD AUTO: 6.8 % (ref 0–13.4)
MONOCYTES NFR BLD AUTO: 8.3 % (ref 0–13.4)
MONOCYTES NFR BLD AUTO: 8.4 % (ref 0–13.4)
MORPHOLOGY BLD-IMP: NORMAL
MYELOCYTES NFR BLD MANUAL: 0.9 %
NEUTROPHILS # BLD AUTO: 11.76 K/UL (ref 1.82–7.42)
NEUTROPHILS # BLD AUTO: 5.16 K/UL (ref 1.82–7.42)
NEUTROPHILS # BLD AUTO: 5.22 K/UL (ref 1.82–7.42)
NEUTROPHILS # BLD AUTO: 5.97 K/UL (ref 1.82–7.42)
NEUTROPHILS # BLD AUTO: 7.81 K/UL (ref 1.82–7.42)
NEUTROPHILS # BLD AUTO: 8.64 K/UL (ref 1.82–7.42)
NEUTROPHILS # BLD AUTO: 9.56 K/UL (ref 1.82–7.42)
NEUTROPHILS NFR BLD: 64.4 % (ref 44–72)
NEUTROPHILS NFR BLD: 65.3 % (ref 44–72)
NEUTROPHILS NFR BLD: 65.3 % (ref 44–72)
NEUTROPHILS NFR BLD: 68.7 % (ref 44–72)
NEUTROPHILS NFR BLD: 75.5 % (ref 44–72)
NEUTROPHILS NFR BLD: 77.2 % (ref 44–72)
NEUTROPHILS NFR BLD: 83.1 % (ref 44–72)
NEUTS BAND NFR BLD MANUAL: 0.8 % (ref 0–10)
NEUTS BAND NFR BLD MANUAL: 1.7 % (ref 0–10)
NEUTS BAND NFR BLD MANUAL: 2.5 % (ref 0–10)
NEUTS BAND NFR BLD MANUAL: 2.5 % (ref 0–10)
NEUTS BAND NFR BLD MANUAL: 2.6 % (ref 0–10)
NRBC # BLD AUTO: 0 K/UL
NRBC # BLD AUTO: 0.02 K/UL
NRBC # BLD AUTO: 0.07 K/UL
NRBC # BLD AUTO: 0.09 K/UL
NRBC # BLD AUTO: 0.21 K/UL
NRBC BLD-RTO: 0 /100 WBC (ref 0–0.2)
NRBC BLD-RTO: 0.2 /100 WBC (ref 0–0.2)
NRBC BLD-RTO: 0.9 /100 WBC (ref 0–0.2)
NRBC BLD-RTO: 1.2 /100 WBC (ref 0–0.2)
NRBC BLD-RTO: 2.4 /100 WBC (ref 0–0.2)
O2/TOTAL GAS SETTING VFR VENT: 100 %
O2/TOTAL GAS SETTING VFR VENT: 50 %
O2/TOTAL GAS SETTING VFR VENT: 50 %
O2/TOTAL GAS SETTING VFR VENT: 60 %
O2/TOTAL GAS SETTING VFR VENT: 80 %
O2/TOTAL GAS SETTING VFR VENT: 90 %
OPIATES UR QL SCN: NEGATIVE
OXYCODONE UR QL SCN: NEGATIVE
PA AA BLD-ACNC: 8.4 % (ref 0–11)
PA AA BLD-ACNC: ABNORMAL % (ref 0–11)
PA ADP BLD-ACNC: 100 % (ref 0–17)
PA ADP BLD-ACNC: ABNORMAL % (ref 0–17)
PCO2 BLDA: 33.3 MMHG (ref 26–37)
PCO2 BLDA: 35.2 MMHG (ref 26–37)
PCO2 BLDA: 37.7 MMHG (ref 32–48)
PCO2 BLDA: 38.3 MMHG (ref 26–37)
PCO2 BLDA: 42.3 MMHG (ref 32–48)
PCO2 BLDA: 54.9 MMHG (ref 32–48)
PCO2 BLDA: 62 MMHG (ref 32–48)
PCO2 BLDA: 66.7 MMHG (ref 32–48)
PCO2 BLDA: 67 MMHG (ref 32–48)
PCO2 BLDA: 67.3 MMHG (ref 32–48)
PCO2 BLDA: 68.1 MMHG (ref 32–48)
PCO2 BLDA: 68.7 MMHG (ref 32–48)
PCO2 BLDA: 69.1 MMHG (ref 32–48)
PCO2 BLDA: 75.6 MMHG (ref 32–48)
PCO2 TEMP ADJ BLDA: 35 MMHG (ref 26–37)
PCO2 TEMP ADJ BLDA: 36.3 MMHG (ref 26–37)
PCO2 TEMP ADJ BLDA: 38 MMHG (ref 26–37)
PCO2 TEMP ADJ BLDA: 40 MMHG (ref 32–48)
PCO2 TEMP ADJ BLDA: 42.3 MMHG (ref 32–48)
PCO2 TEMP ADJ BLDA: 58.9 MMHG (ref 32–48)
PCO2 TEMP ADJ BLDA: 70.6 MMHG (ref 32–48)
PCO2 TEMP ADJ BLDA: 72.2 MMHG (ref 32–48)
PCO2 TEMP ADJ BLDA: 72.4 MMHG (ref 32–48)
PCO2 TEMP ADJ BLDA: 73 MMHG (ref 32–48)
PCO2 TEMP ADJ BLDA: 76.1 MMHG (ref 32–48)
PCO2 TEMP ADJ BLDA: 83.6 MMHG (ref 32–48)
PCP UR QL SCN: NEGATIVE
PEEP END EXPIRATORY PRESSURE IPEEP: 10 CMH20
PEEP END EXPIRATORY PRESSURE IPEEP: 12 CMH20
PEEP END EXPIRATORY PRESSURE IPEEP: 8 CMH20
PEEP END EXPIRATORY PRESSURE IPEEP: 8 CMH20
PERCENT MINUTE VOLUME IPMV: 100
PERCENT MINUTE VOLUME IPMV: 100
PERCENT MINUTE VOLUME IPMV: 120
PH BLDA: 7.17 [PH] (ref 7.35–7.45)
PH BLDA: 7.2 [PH] (ref 7.35–7.45)
PH BLDA: 7.2 [PH] (ref 7.35–7.45)
PH BLDA: 7.21 [PH] (ref 7.35–7.45)
PH BLDA: 7.23 [PH] (ref 7.35–7.45)
PH BLDA: 7.23 [PH] (ref 7.35–7.45)
PH BLDA: 7.26 [PH] (ref 7.35–7.45)
PH BLDA: 7.28 [PH] (ref 7.4–7.5)
PH BLDA: 7.29 [PH] (ref 7.4–7.5)
PH BLDA: 7.37 [PH] (ref 7.35–7.45)
PH BLDA: 7.37 [PH] (ref 7.35–7.45)
PH BLDA: 7.37 [PH] (ref 7.4–7.5)
PH TEMP ADJ BLDA: 7.14 [PH] (ref 7.35–7.45)
PH TEMP ADJ BLDA: 7.17 [PH] (ref 7.35–7.45)
PH TEMP ADJ BLDA: 7.18 [PH] (ref 7.35–7.45)
PH TEMP ADJ BLDA: 7.19 [PH] (ref 7.35–7.45)
PH TEMP ADJ BLDA: 7.2 [PH] (ref 7.35–7.45)
PH TEMP ADJ BLDA: 7.21 [PH] (ref 7.35–7.45)
PH TEMP ADJ BLDA: 7.24 [PH] (ref 7.35–7.45)
PH TEMP ADJ BLDA: 7.27 [PH] (ref 7.4–7.5)
PH TEMP ADJ BLDA: 7.3 [PH] (ref 7.4–7.5)
PH TEMP ADJ BLDA: 7.35 [PH] (ref 7.35–7.45)
PH TEMP ADJ BLDA: 7.35 [PH] (ref 7.4–7.5)
PH TEMP ADJ BLDA: 7.37 [PH] (ref 7.35–7.45)
PHOSPHATE SERPL-MCNC: 0.8 MG/DL (ref 2.5–4.5)
PHOSPHATE SERPL-MCNC: 1.2 MG/DL (ref 2.5–4.5)
PHOSPHATE SERPL-MCNC: 2.5 MG/DL (ref 2.5–4.5)
PHOSPHATE SERPL-MCNC: 2.5 MG/DL (ref 2.5–4.5)
PHOSPHATE SERPL-MCNC: 2.6 MG/DL (ref 2.5–4.5)
PHOSPHATE SERPL-MCNC: 5.8 MG/DL (ref 2.5–4.5)
PLATELET # BLD AUTO: 126 K/UL (ref 164–446)
PLATELET # BLD AUTO: 151 K/UL (ref 164–446)
PLATELET # BLD AUTO: 163 K/UL (ref 164–446)
PLATELET # BLD AUTO: 184 K/UL (ref 164–446)
PLATELET # BLD AUTO: 191 K/UL (ref 164–446)
PLATELET # BLD AUTO: 218 K/UL (ref 164–446)
PLATELET # BLD AUTO: 262 K/UL (ref 164–446)
PLATELET # BLD AUTO: 274 K/UL (ref 164–446)
PLATELET BLD QL SMEAR: NORMAL
PMV BLD AUTO: 10 FL (ref 9–12.9)
PMV BLD AUTO: 10.5 FL (ref 9–12.9)
PMV BLD AUTO: 10.5 FL (ref 9–12.9)
PMV BLD AUTO: 10.7 FL (ref 9–12.9)
PMV BLD AUTO: 9.2 FL (ref 9–12.9)
PMV BLD AUTO: 9.2 FL (ref 9–12.9)
PO2 BLDA: 110 MMHG (ref 64–87)
PO2 BLDA: 291.5 MMHG (ref 64–87)
PO2 BLDA: 51 MMHG (ref 83–108)
PO2 BLDA: 56 MMHG (ref 83–108)
PO2 BLDA: 61 MMHG (ref 83–108)
PO2 BLDA: 61 MMHG (ref 83–108)
PO2 BLDA: 62 MMHG (ref 83–108)
PO2 BLDA: 63 MMHG (ref 83–108)
PO2 BLDA: 66 MMHG (ref 64–87)
PO2 BLDA: 68 MMHG (ref 83–108)
PO2 BLDA: 70 MMHG (ref 83–108)
PO2 BLDA: 74 MMHG (ref 83–108)
PO2 BLDA: 84 MMHG (ref 83–108)
PO2 BLDA: 98 MMHG (ref 83–108)
PO2 TEMP ADJ BLDA: 109 MMHG (ref 64–87)
PO2 TEMP ADJ BLDA: 294.9 MMHG (ref 64–87)
PO2 TEMP ADJ BLDA: 55 MMHG (ref 64–87)
PO2 TEMP ADJ BLDA: 62 MMHG (ref 64–87)
PO2 TEMP ADJ BLDA: 68 MMHG (ref 64–87)
PO2 TEMP ADJ BLDA: 71 MMHG (ref 64–87)
PO2 TEMP ADJ BLDA: 71 MMHG (ref 64–87)
PO2 TEMP ADJ BLDA: 72 MMHG (ref 64–87)
PO2 TEMP ADJ BLDA: 74 MMHG (ref 64–87)
PO2 TEMP ADJ BLDA: 77 MMHG (ref 64–87)
PO2 TEMP ADJ BLDA: 83 MMHG (ref 64–87)
PO2 TEMP ADJ BLDA: 84 MMHG (ref 64–87)
POTASSIUM BLD-SCNC: 4.1 MMOL/L (ref 3.6–5.5)
POTASSIUM SERPL-SCNC: 2 MMOL/L (ref 3.6–5.5)
POTASSIUM SERPL-SCNC: 2.1 MMOL/L (ref 3.6–5.5)
POTASSIUM SERPL-SCNC: 2.1 MMOL/L (ref 3.6–5.5)
POTASSIUM SERPL-SCNC: 2.3 MMOL/L (ref 3.6–5.5)
POTASSIUM SERPL-SCNC: 2.4 MMOL/L (ref 3.6–5.5)
POTASSIUM SERPL-SCNC: 3.1 MMOL/L (ref 3.6–5.5)
POTASSIUM SERPL-SCNC: 3.1 MMOL/L (ref 3.6–5.5)
POTASSIUM SERPL-SCNC: 3.6 MMOL/L (ref 3.6–5.5)
POTASSIUM SERPL-SCNC: 3.7 MMOL/L (ref 3.6–5.5)
POTASSIUM SERPL-SCNC: 3.7 MMOL/L (ref 3.6–5.5)
POTASSIUM SERPL-SCNC: 3.8 MMOL/L (ref 3.6–5.5)
POTASSIUM SERPL-SCNC: 3.9 MMOL/L (ref 3.6–5.5)
POTASSIUM SERPL-SCNC: 4 MMOL/L (ref 3.6–5.5)
POTASSIUM SERPL-SCNC: 4 MMOL/L (ref 3.6–5.5)
POTASSIUM SERPL-SCNC: 4.1 MMOL/L (ref 3.6–5.5)
POTASSIUM SERPL-SCNC: 4.3 MMOL/L (ref 3.6–5.5)
POTASSIUM SERPL-SCNC: 4.4 MMOL/L (ref 3.6–5.5)
POTASSIUM SERPL-SCNC: 4.5 MMOL/L (ref 3.6–5.5)
POTASSIUM SERPL-SCNC: 4.8 MMOL/L (ref 3.6–5.5)
POTASSIUM SERPL-SCNC: 5.1 MMOL/L (ref 3.6–5.5)
POTASSIUM SERPL-SCNC: 5.6 MMOL/L (ref 3.6–5.5)
POTASSIUM SERPL-SCNC: 6.4 MMOL/L (ref 3.6–5.5)
PREALB SERPL-MCNC: 9.7 MG/DL (ref 18–38)
PROPOXYPH UR QL SCN: NEGATIVE
PROT SERPL-MCNC: 5.4 G/DL (ref 6–8.2)
PROT SERPL-MCNC: 5.7 G/DL (ref 6–8.2)
PROT SERPL-MCNC: 5.8 G/DL (ref 6–8.2)
PROT SERPL-MCNC: 6.1 G/DL (ref 6–8.2)
PROT SERPL-MCNC: 6.3 G/DL (ref 6–8.2)
PROT SERPL-MCNC: 6.4 G/DL (ref 6–8.2)
PROT SERPL-MCNC: 6.8 G/DL (ref 6–8.2)
PROT SERPL-MCNC: 7 G/DL (ref 6–8.2)
PROTHROMBIN TIME: 15.3 SEC (ref 12–14.6)
PROTHROMBIN TIME: 16.4 SEC (ref 12–14.6)
RBC # BLD AUTO: 2.75 M/UL (ref 4.7–6.1)
RBC # BLD AUTO: 2.98 M/UL (ref 4.7–6.1)
RBC # BLD AUTO: 2.98 M/UL (ref 4.7–6.1)
RBC # BLD AUTO: 3.01 M/UL (ref 4.7–6.1)
RBC # BLD AUTO: 3.07 M/UL (ref 4.7–6.1)
RBC # BLD AUTO: 3.15 M/UL (ref 4.7–6.1)
RBC # BLD AUTO: 4.31 M/UL (ref 4.7–6.1)
RBC # BLD AUTO: 5.55 M/UL (ref 4.7–6.1)
RBC BLD AUTO: PRESENT
RH BLD: NORMAL
SAO2 % BLDA: 76 % (ref 93–99)
SAO2 % BLDA: 81 % (ref 93–99)
SAO2 % BLDA: 82 % (ref 93–99)
SAO2 % BLDA: 84 % (ref 93–99)
SAO2 % BLDA: 86 % (ref 93–99)
SAO2 % BLDA: 87 % (ref 93–99)
SAO2 % BLDA: 89 % (ref 93–99)
SAO2 % BLDA: 91 % (ref 93–99)
SAO2 % BLDA: 92 % (ref 93–99)
SAO2 % BLDA: 93 % (ref 93–99)
SAO2 % BLDA: 96 % (ref 93–99)
SAO2 % BLDA: 96 % (ref 93–99)
SAO2 % BLDA: 98 % (ref 93–99)
SAO2 % BLDA: 99.3 % (ref 93–99)
SODIUM BLD-SCNC: 156 MMOL/L (ref 135–145)
SODIUM SERPL-SCNC: 137 MMOL/L (ref 135–145)
SODIUM SERPL-SCNC: 140 MMOL/L (ref 135–145)
SODIUM SERPL-SCNC: 142 MMOL/L (ref 135–145)
SODIUM SERPL-SCNC: 143 MMOL/L (ref 135–145)
SODIUM SERPL-SCNC: 144 MMOL/L (ref 135–145)
SODIUM SERPL-SCNC: 145 MMOL/L (ref 135–145)
SODIUM SERPL-SCNC: 146 MMOL/L (ref 135–145)
SODIUM SERPL-SCNC: 147 MMOL/L (ref 135–145)
SODIUM SERPL-SCNC: 148 MMOL/L (ref 135–145)
SODIUM SERPL-SCNC: 148 MMOL/L (ref 135–145)
SODIUM SERPL-SCNC: 151 MMOL/L (ref 135–145)
SODIUM SERPL-SCNC: 153 MMOL/L (ref 135–145)
SODIUM SERPL-SCNC: 154 MMOL/L (ref 135–145)
SODIUM SERPL-SCNC: 155 MMOL/L (ref 135–145)
SODIUM SERPL-SCNC: 155 MMOL/L (ref 135–145)
SODIUM SERPL-SCNC: 156 MMOL/L (ref 135–145)
SODIUM SERPL-SCNC: 157 MMOL/L (ref 135–145)
SODIUM SERPL-SCNC: 157 MMOL/L (ref 135–145)
SODIUM SERPL-SCNC: 158 MMOL/L (ref 135–145)
SODIUM SERPL-SCNC: 160 MMOL/L (ref 135–145)
SPECIMEN DRAWN FROM PATIENT: ABNORMAL
TEG ALGORITHM TGALG: ABNORMAL
TEG ALGORITHM TGALG: ABNORMAL
TIDAL VOLUME IVT: 390 ML
TIDAL VOLUME IVT: 400 ML
TIDAL VOLUME IVT: 450 ML
TRIGL SERPL-MCNC: 244 MG/DL (ref 0–149)
TRIGL SERPL-MCNC: 249 MG/DL (ref 0–149)
TRIGL SERPL-MCNC: 256 MG/DL (ref 0–149)
TRIGL SERPL-MCNC: 260 MG/DL (ref 0–149)
TRIGL SERPL-MCNC: 346 MG/DL (ref 0–149)
TROPONIN T SERPL-MCNC: 180 NG/L (ref 6–19)
TROPONIN T SERPL-MCNC: 32 NG/L (ref 6–19)
TROPONIN T SERPL-MCNC: 41 NG/L (ref 6–19)
TROPONIN T SERPL-MCNC: 51 NG/L (ref 6–19)
WBC # BLD AUTO: 11.1 K/UL (ref 4.8–10.8)
WBC # BLD AUTO: 11.4 K/UL (ref 4.8–10.8)
WBC # BLD AUTO: 11.4 K/UL (ref 4.8–10.8)
WBC # BLD AUTO: 15.2 K/UL (ref 4.8–10.8)
WBC # BLD AUTO: 28.9 K/UL (ref 4.8–10.8)
WBC # BLD AUTO: 7.7 K/UL (ref 4.8–10.8)
WBC # BLD AUTO: 7.7 K/UL (ref 4.8–10.8)
WBC # BLD AUTO: 8.8 K/UL (ref 4.8–10.8)

## 2024-01-01 PROCEDURE — A9270 NON-COVERED ITEM OR SERVICE: HCPCS | Performed by: NURSE PRACTITIONER

## 2024-01-01 PROCEDURE — 73130 X-RAY EXAM OF HAND: CPT | Mod: RT

## 2024-01-01 PROCEDURE — 85730 THROMBOPLASTIN TIME PARTIAL: CPT

## 2024-01-01 PROCEDURE — 82962 GLUCOSE BLOOD TEST: CPT | Mod: 91

## 2024-01-01 PROCEDURE — 82803 BLOOD GASES ANY COMBINATION: CPT

## 2024-01-01 PROCEDURE — 03HY32Z INSERTION OF MONITORING DEVICE INTO UPPER ARTERY, PERCUTANEOUS APPROACH: ICD-10-PCS

## 2024-01-01 PROCEDURE — 4A133J1 MONITORING OF ARTERIAL PULSE, PERIPHERAL, PERCUTANEOUS APPROACH: ICD-10-PCS

## 2024-01-01 PROCEDURE — G0390 TRAUMA RESPONS W/HOSP CRITI: HCPCS

## 2024-01-01 PROCEDURE — A9270 NON-COVERED ITEM OR SERVICE: HCPCS | Performed by: STUDENT IN AN ORGANIZED HEALTH CARE EDUCATION/TRAINING PROGRAM

## 2024-01-01 PROCEDURE — 700111 HCHG RX REV CODE 636 W/ 250 OVERRIDE (IP): Performed by: STUDENT IN AN ORGANIZED HEALTH CARE EDUCATION/TRAINING PROGRAM

## 2024-01-01 PROCEDURE — 700111 HCHG RX REV CODE 636 W/ 250 OVERRIDE (IP): Performed by: EMERGENCY MEDICINE

## 2024-01-01 PROCEDURE — 93005 ELECTROCARDIOGRAM TRACING: CPT | Performed by: STUDENT IN AN ORGANIZED HEALTH CARE EDUCATION/TRAINING PROGRAM

## 2024-01-01 PROCEDURE — 36600 WITHDRAWAL OF ARTERIAL BLOOD: CPT

## 2024-01-01 PROCEDURE — 85025 COMPLETE CBC W/AUTO DIFF WBC: CPT

## 2024-01-01 PROCEDURE — 84100 ASSAY OF PHOSPHORUS: CPT

## 2024-01-01 PROCEDURE — 85007 BL SMEAR W/DIFF WBC COUNT: CPT

## 2024-01-01 PROCEDURE — 85347 COAGULATION TIME ACTIVATED: CPT

## 2024-01-01 PROCEDURE — A9270 NON-COVERED ITEM OR SERVICE: HCPCS

## 2024-01-01 PROCEDURE — 770022 HCHG ROOM/CARE - ICU (200)

## 2024-01-01 PROCEDURE — 700105 HCHG RX REV CODE 258: Performed by: STUDENT IN AN ORGANIZED HEALTH CARE EDUCATION/TRAINING PROGRAM

## 2024-01-01 PROCEDURE — 83605 ASSAY OF LACTIC ACID: CPT

## 2024-01-01 PROCEDURE — 93005 ELECTROCARDIOGRAM TRACING: CPT

## 2024-01-01 PROCEDURE — 700101 HCHG RX REV CODE 250: Performed by: NEUROLOGICAL SURGERY

## 2024-01-01 PROCEDURE — 86900 BLOOD TYPING SEROLOGIC ABO: CPT

## 2024-01-01 PROCEDURE — 99291 CRITICAL CARE FIRST HOUR: CPT | Performed by: STUDENT IN AN ORGANIZED HEALTH CARE EDUCATION/TRAINING PROGRAM

## 2024-01-01 PROCEDURE — 83605 ASSAY OF LACTIC ACID: CPT | Mod: 91

## 2024-01-01 PROCEDURE — 82533 TOTAL CORTISOL: CPT

## 2024-01-01 PROCEDURE — 700111 HCHG RX REV CODE 636 W/ 250 OVERRIDE (IP)

## 2024-01-01 PROCEDURE — 85576 BLOOD PLATELET AGGREGATION: CPT | Mod: 91

## 2024-01-01 PROCEDURE — 92950 HEART/LUNG RESUSCITATION CPR: CPT

## 2024-01-01 PROCEDURE — 86901 BLOOD TYPING SEROLOGIC RH(D): CPT

## 2024-01-01 PROCEDURE — 700111 HCHG RX REV CODE 636 W/ 250 OVERRIDE (IP): Mod: JZ | Performed by: NURSE PRACTITIONER

## 2024-01-01 PROCEDURE — 700102 HCHG RX REV CODE 250 W/ 637 OVERRIDE(OP): Performed by: STUDENT IN AN ORGANIZED HEALTH CARE EDUCATION/TRAINING PROGRAM

## 2024-01-01 PROCEDURE — 80048 BASIC METABOLIC PNL TOTAL CA: CPT

## 2024-01-01 PROCEDURE — 700102 HCHG RX REV CODE 250 W/ 637 OVERRIDE(OP): Performed by: NURSE PRACTITIONER

## 2024-01-01 PROCEDURE — 73560 X-RAY EXAM OF KNEE 1 OR 2: CPT | Mod: LT

## 2024-01-01 PROCEDURE — 700111 HCHG RX REV CODE 636 W/ 250 OVERRIDE (IP): Mod: JZ | Performed by: STUDENT IN AN ORGANIZED HEALTH CARE EDUCATION/TRAINING PROGRAM

## 2024-01-01 PROCEDURE — 85384 FIBRINOGEN ACTIVITY: CPT | Mod: 91

## 2024-01-01 PROCEDURE — 71045 X-RAY EXAM CHEST 1 VIEW: CPT

## 2024-01-01 PROCEDURE — 700101 HCHG RX REV CODE 250

## 2024-01-01 PROCEDURE — 84478 ASSAY OF TRIGLYCERIDES: CPT

## 2024-01-01 PROCEDURE — 83735 ASSAY OF MAGNESIUM: CPT

## 2024-01-01 PROCEDURE — 72170 X-RAY EXAM OF PELVIS: CPT

## 2024-01-01 PROCEDURE — 94799 UNLISTED PULMONARY SVC/PX: CPT

## 2024-01-01 PROCEDURE — 4A133B1 MONITORING OF ARTERIAL PRESSURE, PERIPHERAL, PERCUTANEOUS APPROACH: ICD-10-PCS

## 2024-01-01 PROCEDURE — 70498 CT ANGIOGRAPHY NECK: CPT

## 2024-01-01 PROCEDURE — 93970 EXTREMITY STUDY: CPT

## 2024-01-01 PROCEDURE — 82803 BLOOD GASES ANY COMBINATION: CPT | Mod: 91

## 2024-01-01 PROCEDURE — 80053 COMPREHEN METABOLIC PANEL: CPT

## 2024-01-01 PROCEDURE — 700102 HCHG RX REV CODE 250 W/ 637 OVERRIDE(OP)

## 2024-01-01 PROCEDURE — C1729 CATH, DRAINAGE: HCPCS

## 2024-01-01 PROCEDURE — 93970 EXTREMITY STUDY: CPT | Mod: 26 | Performed by: INTERNAL MEDICINE

## 2024-01-01 PROCEDURE — 93010 ELECTROCARDIOGRAM REPORT: CPT | Mod: 77 | Performed by: INTERNAL MEDICINE

## 2024-01-01 PROCEDURE — 700105 HCHG RX REV CODE 258: Performed by: NEUROLOGICAL SURGERY

## 2024-01-01 PROCEDURE — 82962 GLUCOSE BLOOD TEST: CPT

## 2024-01-01 PROCEDURE — 86140 C-REACTIVE PROTEIN: CPT

## 2024-01-01 PROCEDURE — 94003 VENT MGMT INPAT SUBQ DAY: CPT

## 2024-01-01 PROCEDURE — 37799 UNLISTED PX VASCULAR SURGERY: CPT

## 2024-01-01 PROCEDURE — 36415 COLL VENOUS BLD VENIPUNCTURE: CPT

## 2024-01-01 PROCEDURE — 93306 TTE W/DOPPLER COMPLETE: CPT

## 2024-01-01 PROCEDURE — 02HV33Z INSERTION OF INFUSION DEVICE INTO SUPERIOR VENA CAVA, PERCUTANEOUS APPROACH: ICD-10-PCS | Performed by: STUDENT IN AN ORGANIZED HEALTH CARE EDUCATION/TRAINING PROGRAM

## 2024-01-01 PROCEDURE — 80048 BASIC METABOLIC PNL TOTAL CA: CPT | Mod: 91

## 2024-01-01 PROCEDURE — 700101 HCHG RX REV CODE 250: Performed by: STUDENT IN AN ORGANIZED HEALTH CARE EDUCATION/TRAINING PROGRAM

## 2024-01-01 PROCEDURE — 84484 ASSAY OF TROPONIN QUANT: CPT | Mod: 91

## 2024-01-01 PROCEDURE — 700105 HCHG RX REV CODE 258: Performed by: NURSE PRACTITIONER

## 2024-01-01 PROCEDURE — 96374 THER/PROPH/DIAG INJ IV PUSH: CPT

## 2024-01-01 PROCEDURE — 36620 INSERTION CATHETER ARTERY: CPT

## 2024-01-01 PROCEDURE — 73560 X-RAY EXAM OF KNEE 1 OR 2: CPT | Mod: RT

## 2024-01-01 PROCEDURE — 93010 ELECTROCARDIOGRAM REPORT: CPT | Performed by: INTERNAL MEDICINE

## 2024-01-01 PROCEDURE — 82077 ASSAY SPEC XCP UR&BREATH IA: CPT

## 2024-01-01 PROCEDURE — 85027 COMPLETE CBC AUTOMATED: CPT

## 2024-01-01 PROCEDURE — 85384 FIBRINOGEN ACTIVITY: CPT

## 2024-01-01 PROCEDURE — 700105 HCHG RX REV CODE 258

## 2024-01-01 PROCEDURE — 84484 ASSAY OF TROPONIN QUANT: CPT

## 2024-01-01 PROCEDURE — 85610 PROTHROMBIN TIME: CPT

## 2024-01-01 PROCEDURE — 84295 ASSAY OF SERUM SODIUM: CPT

## 2024-01-01 PROCEDURE — 86850 RBC ANTIBODY SCREEN: CPT

## 2024-01-01 PROCEDURE — 36556 INSERT NON-TUNNEL CV CATH: CPT | Performed by: STUDENT IN AN ORGANIZED HEALTH CARE EDUCATION/TRAINING PROGRAM

## 2024-01-01 PROCEDURE — 36620 INSERTION CATHETER ARTERY: CPT | Mod: DUPCHRG | Performed by: STUDENT IN AN ORGANIZED HEALTH CARE EDUCATION/TRAINING PROGRAM

## 2024-01-01 PROCEDURE — 82330 ASSAY OF CALCIUM: CPT

## 2024-01-01 PROCEDURE — 302154 KIT COOLING VEST BARIATRIC BLANKET: Performed by: STUDENT IN AN ORGANIZED HEALTH CARE EDUCATION/TRAINING PROGRAM

## 2024-01-01 PROCEDURE — 93010 ELECTROCARDIOGRAM REPORT: CPT | Mod: 59 | Performed by: INTERNAL MEDICINE

## 2024-01-01 PROCEDURE — 73130 X-RAY EXAM OF HAND: CPT | Mod: LT

## 2024-01-01 PROCEDURE — 85018 HEMOGLOBIN: CPT

## 2024-01-01 PROCEDURE — 94644 CONT INHLJ TX 1ST HOUR: CPT

## 2024-01-01 PROCEDURE — 99291 CRITICAL CARE FIRST HOUR: CPT

## 2024-01-01 PROCEDURE — 99152 MOD SED SAME PHYS/QHP 5/>YRS: CPT

## 2024-01-01 PROCEDURE — 61107 TDH PNXR IMPLT VENTR CATH: CPT

## 2024-01-01 PROCEDURE — 009630Z DRAINAGE OF CEREBRAL VENTRICLE WITH DRAINAGE DEVICE, PERCUTANEOUS APPROACH: ICD-10-PCS | Performed by: NEUROLOGICAL SURGERY

## 2024-01-01 PROCEDURE — 94645 CONT INHLJ TX EACH ADDL HOUR: CPT

## 2024-01-01 PROCEDURE — 700111 HCHG RX REV CODE 636 W/ 250 OVERRIDE (IP): Performed by: NURSE PRACTITIONER

## 2024-01-01 PROCEDURE — 700117 HCHG RX CONTRAST REV CODE 255: Performed by: EMERGENCY MEDICINE

## 2024-01-01 PROCEDURE — 94002 VENT MGMT INPAT INIT DAY: CPT

## 2024-01-01 PROCEDURE — 84132 ASSAY OF SERUM POTASSIUM: CPT

## 2024-01-01 PROCEDURE — 700111 HCHG RX REV CODE 636 W/ 250 OVERRIDE (IP): Mod: JZ | Performed by: NEUROLOGICAL SURGERY

## 2024-01-01 PROCEDURE — 80307 DRUG TEST PRSMV CHEM ANLYZR: CPT

## 2024-01-01 PROCEDURE — 84134 ASSAY OF PREALBUMIN: CPT

## 2024-01-01 PROCEDURE — 70450 CT HEAD/BRAIN W/O DYE: CPT

## 2024-01-01 PROCEDURE — 73620 X-RAY EXAM OF FOOT: CPT | Mod: RT

## 2024-01-01 PROCEDURE — 93306 TTE W/DOPPLER COMPLETE: CPT | Mod: 26 | Performed by: INTERNAL MEDICINE

## 2024-01-01 RX ORDER — FUROSEMIDE 10 MG/ML
20 INJECTION INTRAMUSCULAR; INTRAVENOUS ONCE
Status: DISCONTINUED | OUTPATIENT
Start: 2024-01-01 | End: 2024-01-01 | Stop reason: HOSPADM

## 2024-01-01 RX ORDER — SODIUM PHOSPHATE,MONO-DIBASIC 19G-7G/118
1 ENEMA (ML) RECTAL
Status: DISCONTINUED | OUTPATIENT
Start: 2024-01-01 | End: 2024-01-01 | Stop reason: HOSPADM

## 2024-01-01 RX ORDER — DOCUSATE SODIUM 100 MG/1
100 CAPSULE, LIQUID FILLED ORAL 2 TIMES DAILY
Status: DISCONTINUED | OUTPATIENT
Start: 2024-01-01 | End: 2024-01-01

## 2024-01-01 RX ORDER — OXYCODONE HYDROCHLORIDE 5 MG/1
5 TABLET ORAL
Status: DISCONTINUED | OUTPATIENT
Start: 2024-01-01 | End: 2024-01-01

## 2024-01-01 RX ORDER — ONDANSETRON 4 MG/1
4 TABLET, ORALLY DISINTEGRATING ORAL EVERY 4 HOURS PRN
Status: DISCONTINUED | OUTPATIENT
Start: 2024-01-01 | End: 2024-01-01

## 2024-01-01 RX ORDER — POTASSIUM CHLORIDE 14.9 MG/ML
20 INJECTION INTRAVENOUS ONCE
Status: COMPLETED | OUTPATIENT
Start: 2024-01-01 | End: 2024-01-01

## 2024-01-01 RX ORDER — POTASSIUM CHLORIDE 29.8 MG/ML
40 INJECTION INTRAVENOUS ONCE
Status: COMPLETED | OUTPATIENT
Start: 2024-01-01 | End: 2024-01-01

## 2024-01-01 RX ORDER — ONDANSETRON 2 MG/ML
4 INJECTION INTRAMUSCULAR; INTRAVENOUS EVERY 4 HOURS PRN
Status: DISCONTINUED | OUTPATIENT
Start: 2024-01-01 | End: 2024-01-01 | Stop reason: HOSPADM

## 2024-01-01 RX ORDER — BISACODYL 10 MG
10 SUPPOSITORY, RECTAL RECTAL
Status: DISCONTINUED | OUTPATIENT
Start: 2024-01-01 | End: 2024-01-01 | Stop reason: HOSPADM

## 2024-01-01 RX ORDER — MIDAZOLAM HYDROCHLORIDE 1 MG/ML
1 INJECTION INTRAMUSCULAR; INTRAVENOUS
Status: DISCONTINUED | OUTPATIENT
Start: 2024-01-01 | End: 2024-01-01

## 2024-01-01 RX ORDER — MIDAZOLAM HYDROCHLORIDE 1 MG/ML
2 INJECTION INTRAMUSCULAR; INTRAVENOUS ONCE
Status: COMPLETED | OUTPATIENT
Start: 2024-01-01 | End: 2024-01-01

## 2024-01-01 RX ORDER — FUROSEMIDE 10 MG/ML
20 INJECTION INTRAMUSCULAR; INTRAVENOUS ONCE
Status: COMPLETED | OUTPATIENT
Start: 2024-01-01 | End: 2024-01-01

## 2024-01-01 RX ORDER — PROPRANOLOL HYDROCHLORIDE 1 MG/ML
1 INJECTION, SOLUTION INTRAVENOUS ONCE
Status: COMPLETED | OUTPATIENT
Start: 2024-01-01 | End: 2024-01-01

## 2024-01-01 RX ORDER — FUROSEMIDE 20 MG/1
20 TABLET ORAL ONCE
Status: COMPLETED | OUTPATIENT
Start: 2024-01-01 | End: 2024-01-01

## 2024-01-01 RX ORDER — ONDANSETRON 4 MG/1
4 TABLET, ORALLY DISINTEGRATING ORAL EVERY 4 HOURS PRN
Status: DISCONTINUED | OUTPATIENT
Start: 2024-01-01 | End: 2024-01-01 | Stop reason: HOSPADM

## 2024-01-01 RX ORDER — BACLOFEN 10 MG/1
10 TABLET ORAL 3 TIMES DAILY
Status: DISCONTINUED | OUTPATIENT
Start: 2024-01-01 | End: 2024-01-01

## 2024-01-01 RX ORDER — LEVETIRACETAM 500 MG/1
500 TABLET ORAL EVERY 12 HOURS
Status: DISCONTINUED | OUTPATIENT
Start: 2024-01-01 | End: 2024-01-01 | Stop reason: HOSPADM

## 2024-01-01 RX ORDER — SODIUM CHLORIDE 1 G/1
2 TABLET ORAL
Status: DISCONTINUED | OUTPATIENT
Start: 2024-01-01 | End: 2024-01-01

## 2024-01-01 RX ORDER — LABETALOL HYDROCHLORIDE 5 MG/ML
10-20 INJECTION, SOLUTION INTRAVENOUS EVERY 4 HOURS PRN
Status: DISCONTINUED | OUTPATIENT
Start: 2024-01-01 | End: 2024-01-01 | Stop reason: HOSPADM

## 2024-01-01 RX ORDER — GABAPENTIN 100 MG/1
100 CAPSULE ORAL EVERY 8 HOURS
Status: DISCONTINUED | OUTPATIENT
Start: 2024-01-01 | End: 2024-01-01 | Stop reason: HOSPADM

## 2024-01-01 RX ORDER — PROPRANOLOL HYDROCHLORIDE 10 MG/1
10 TABLET ORAL EVERY 8 HOURS
Status: DISCONTINUED | OUTPATIENT
Start: 2024-01-01 | End: 2024-01-01 | Stop reason: HOSPADM

## 2024-01-01 RX ORDER — INSULIN LISPRO 100 [IU]/ML
1-6 INJECTION, SOLUTION INTRAVENOUS; SUBCUTANEOUS EVERY 6 HOURS
Status: DISCONTINUED | OUTPATIENT
Start: 2024-01-01 | End: 2024-01-01 | Stop reason: HOSPADM

## 2024-01-01 RX ORDER — LEVETIRACETAM 500 MG/5ML
500 INJECTION, SOLUTION, CONCENTRATE INTRAVENOUS EVERY 12 HOURS
Status: DISCONTINUED | OUTPATIENT
Start: 2024-01-01 | End: 2024-01-01

## 2024-01-01 RX ORDER — MIDAZOLAM HYDROCHLORIDE 1 MG/ML
1 INJECTION INTRAMUSCULAR; INTRAVENOUS ONCE
Status: DISPENSED | OUTPATIENT
Start: 2024-01-01 | End: 2024-01-01

## 2024-01-01 RX ORDER — DOCUSATE SODIUM 50 MG/5ML
100 LIQUID ORAL 2 TIMES DAILY
Status: DISCONTINUED | OUTPATIENT
Start: 2024-01-01 | End: 2024-01-01 | Stop reason: HOSPADM

## 2024-01-01 RX ORDER — SODIUM CHLORIDE 9 MG/ML
1000 INJECTION, SOLUTION INTRAVENOUS ONCE
Status: COMPLETED | OUTPATIENT
Start: 2024-01-01 | End: 2024-01-01

## 2024-01-01 RX ORDER — CLONIDINE HYDROCHLORIDE 0.1 MG/1
0.1 TABLET ORAL TWICE DAILY
Status: DISCONTINUED | OUTPATIENT
Start: 2024-01-01 | End: 2024-01-01 | Stop reason: HOSPADM

## 2024-01-01 RX ORDER — METAXALONE 800 MG/1
800 TABLET ORAL EVERY 8 HOURS
Status: DISCONTINUED | OUTPATIENT
Start: 2024-01-01 | End: 2024-01-01 | Stop reason: HOSPADM

## 2024-01-01 RX ORDER — DEXTROSE MONOHYDRATE 25 G/50ML
25 INJECTION, SOLUTION INTRAVENOUS ONCE
Status: COMPLETED | OUTPATIENT
Start: 2024-01-01 | End: 2024-01-01

## 2024-01-01 RX ORDER — FUROSEMIDE 10 MG/ML
20 INJECTION INTRAMUSCULAR; INTRAVENOUS
Status: DISCONTINUED | OUTPATIENT
Start: 2024-01-01 | End: 2024-01-01

## 2024-01-01 RX ORDER — SODIUM CHLORIDE 9 MG/ML
INJECTION, SOLUTION INTRAVENOUS CONTINUOUS
Status: DISCONTINUED | OUTPATIENT
Start: 2024-01-01 | End: 2024-01-01

## 2024-01-01 RX ORDER — MAGNESIUM SULFATE HEPTAHYDRATE 40 MG/ML
2 INJECTION, SOLUTION INTRAVENOUS ONCE
Status: COMPLETED | OUTPATIENT
Start: 2024-01-01 | End: 2024-01-01

## 2024-01-01 RX ORDER — ACETAMINOPHEN 500 MG
1000 TABLET ORAL EVERY 8 HOURS
Status: DISCONTINUED | OUTPATIENT
Start: 2024-01-01 | End: 2024-01-01 | Stop reason: HOSPADM

## 2024-01-01 RX ORDER — VECURONIUM BROMIDE 1 MG/ML
0.1 INJECTION, POWDER, LYOPHILIZED, FOR SOLUTION INTRAVENOUS
Status: DISCONTINUED | OUTPATIENT
Start: 2024-01-01 | End: 2024-01-01 | Stop reason: HOSPADM

## 2024-01-01 RX ORDER — EPINEPHRINE 0.1 MG/ML
SYRINGE (ML) INJECTION
Status: COMPLETED | OUTPATIENT
Start: 2024-01-01 | End: 2024-01-01

## 2024-01-01 RX ORDER — ACETAMINOPHEN 650 MG/1
650 SUPPOSITORY RECTAL EVERY 4 HOURS PRN
Status: DISCONTINUED | OUTPATIENT
Start: 2024-01-01 | End: 2024-01-01 | Stop reason: HOSPADM

## 2024-01-01 RX ORDER — MIDAZOLAM HYDROCHLORIDE 1 MG/ML
INJECTION INTRAMUSCULAR; INTRAVENOUS
Status: COMPLETED
Start: 2024-01-01 | End: 2024-01-01

## 2024-01-01 RX ORDER — OXYCODONE HYDROCHLORIDE 5 MG/1
5 TABLET ORAL
Status: DISCONTINUED | OUTPATIENT
Start: 2024-01-01 | End: 2024-01-01 | Stop reason: HOSPADM

## 2024-01-01 RX ORDER — VECURONIUM BROMIDE 1 MG/ML
0.1 INJECTION, POWDER, LYOPHILIZED, FOR SOLUTION INTRAVENOUS ONCE
Status: COMPLETED | OUTPATIENT
Start: 2024-01-01 | End: 2024-01-01

## 2024-01-01 RX ORDER — MIDAZOLAM HYDROCHLORIDE 1 MG/ML
10 INJECTION INTRAMUSCULAR; INTRAVENOUS ONCE
Status: DISCONTINUED | OUTPATIENT
Start: 2024-01-01 | End: 2024-01-01

## 2024-01-01 RX ORDER — ACETAMINOPHEN 325 MG/1
650 TABLET ORAL EVERY 4 HOURS PRN
Status: DISCONTINUED | OUTPATIENT
Start: 2024-01-01 | End: 2024-01-01 | Stop reason: HOSPADM

## 2024-01-01 RX ORDER — AMOXICILLIN 250 MG
1 CAPSULE ORAL NIGHTLY
Status: DISCONTINUED | OUTPATIENT
Start: 2024-01-01 | End: 2024-01-01 | Stop reason: HOSPADM

## 2024-01-01 RX ORDER — NOREPINEPHRINE BITARTRATE 0.06 MG/ML
0-1 INJECTION, SOLUTION INTRAVENOUS CONTINUOUS
Status: DISCONTINUED | OUTPATIENT
Start: 2024-01-01 | End: 2024-01-01 | Stop reason: HOSPADM

## 2024-01-01 RX ORDER — OXYCODONE HYDROCHLORIDE 10 MG/1
10 TABLET ORAL
Status: DISCONTINUED | OUTPATIENT
Start: 2024-01-01 | End: 2024-01-01 | Stop reason: HOSPADM

## 2024-01-01 RX ORDER — METOPROLOL TARTRATE 1 MG/ML
5 INJECTION, SOLUTION INTRAVENOUS
Status: DISCONTINUED | OUTPATIENT
Start: 2024-01-01 | End: 2024-01-01 | Stop reason: HOSPADM

## 2024-01-01 RX ORDER — QUETIAPINE FUMARATE 100 MG/1
100 TABLET, FILM COATED ORAL EVERY 8 HOURS
Status: DISCONTINUED | OUTPATIENT
Start: 2024-01-01 | End: 2024-01-01 | Stop reason: HOSPADM

## 2024-01-01 RX ORDER — QUETIAPINE FUMARATE 100 MG/1
100 TABLET, FILM COATED ORAL 3 TIMES DAILY
Status: DISCONTINUED | OUTPATIENT
Start: 2024-01-01 | End: 2024-01-01

## 2024-01-01 RX ORDER — HYDRALAZINE HYDROCHLORIDE 20 MG/ML
10-20 INJECTION INTRAMUSCULAR; INTRAVENOUS EVERY 4 HOURS PRN
Status: DISCONTINUED | OUTPATIENT
Start: 2024-01-01 | End: 2024-01-01

## 2024-01-01 RX ORDER — GABAPENTIN 100 MG/1
100 CAPSULE ORAL 3 TIMES DAILY
Status: DISCONTINUED | OUTPATIENT
Start: 2024-01-01 | End: 2024-01-01

## 2024-01-01 RX ORDER — METAXALONE 800 MG/1
800 TABLET ORAL 3 TIMES DAILY
Status: DISCONTINUED | OUTPATIENT
Start: 2024-01-01 | End: 2024-01-01

## 2024-01-01 RX ORDER — CLONIDINE HYDROCHLORIDE 0.1 MG/1
0.1 TABLET ORAL TWICE DAILY
Status: DISCONTINUED | OUTPATIENT
Start: 2024-01-01 | End: 2024-01-01

## 2024-01-01 RX ORDER — MIDAZOLAM HYDROCHLORIDE 1 MG/ML
5 INJECTION INTRAMUSCULAR; INTRAVENOUS ONCE
Status: COMPLETED | OUTPATIENT
Start: 2024-01-01 | End: 2024-01-01

## 2024-01-01 RX ORDER — BACLOFEN 10 MG/1
10 TABLET ORAL EVERY 8 HOURS
Status: DISCONTINUED | OUTPATIENT
Start: 2024-01-01 | End: 2024-01-01 | Stop reason: HOSPADM

## 2024-01-01 RX ORDER — DEXTROSE MONOHYDRATE 25 G/50ML
25 INJECTION, SOLUTION INTRAVENOUS
Status: DISCONTINUED | OUTPATIENT
Start: 2024-01-01 | End: 2024-01-01 | Stop reason: HOSPADM

## 2024-01-01 RX ORDER — FAMOTIDINE 20 MG/1
20 TABLET, FILM COATED ORAL 2 TIMES DAILY
Status: DISCONTINUED | OUTPATIENT
Start: 2024-01-01 | End: 2024-01-01 | Stop reason: HOSPADM

## 2024-01-01 RX ORDER — AMOXICILLIN 250 MG
1 CAPSULE ORAL
Status: DISCONTINUED | OUTPATIENT
Start: 2024-01-01 | End: 2024-01-01 | Stop reason: HOSPADM

## 2024-01-01 RX ORDER — POLYETHYLENE GLYCOL 3350 17 G/17G
1 POWDER, FOR SOLUTION ORAL 2 TIMES DAILY
Status: DISCONTINUED | OUTPATIENT
Start: 2024-01-01 | End: 2024-01-01 | Stop reason: HOSPADM

## 2024-01-01 RX ORDER — PHENYLEPHRINE HCL IN 0.9% NACL 1 MG/10 ML
SYRINGE (ML) INTRAVENOUS
Status: DISCONTINUED
Start: 2024-01-01 | End: 2024-01-01 | Stop reason: HOSPADM

## 2024-01-01 RX ORDER — OXYCODONE HYDROCHLORIDE 10 MG/1
10 TABLET ORAL
Status: DISCONTINUED | OUTPATIENT
Start: 2024-01-01 | End: 2024-01-01

## 2024-01-01 RX ORDER — CALCIUM GLUCONATE 20 MG/ML
2 INJECTION, SOLUTION INTRAVENOUS ONCE
Status: COMPLETED | OUTPATIENT
Start: 2024-01-01 | End: 2024-01-01

## 2024-01-01 RX ORDER — NOREPINEPHRINE BITARTRATE 0.03 MG/ML
0-1 INJECTION, SOLUTION INTRAVENOUS CONTINUOUS
Status: DISCONTINUED | OUTPATIENT
Start: 2024-01-01 | End: 2024-01-01

## 2024-01-01 RX ORDER — CALCIUM CHLORIDE 100 MG/ML
INJECTION INTRAVENOUS; INTRAVENTRICULAR
Status: COMPLETED | OUTPATIENT
Start: 2024-01-01 | End: 2024-01-01

## 2024-01-01 RX ORDER — MIDAZOLAM HYDROCHLORIDE 1 MG/ML
2 INJECTION INTRAMUSCULAR; INTRAVENOUS
Status: DISCONTINUED | OUTPATIENT
Start: 2024-01-01 | End: 2024-01-01 | Stop reason: HOSPADM

## 2024-01-01 RX ORDER — 3% SODIUM CHLORIDE 3 G/100ML
0-60 INJECTION, SOLUTION INTRAVENOUS CONTINUOUS
Status: DISCONTINUED | OUTPATIENT
Start: 2024-01-01 | End: 2024-01-01 | Stop reason: HOSPADM

## 2024-01-01 RX ORDER — SODIUM CHLORIDE 1 G/1
2 TABLET ORAL EVERY 8 HOURS
Status: DISCONTINUED | OUTPATIENT
Start: 2024-01-01 | End: 2024-01-01 | Stop reason: HOSPADM

## 2024-01-01 RX ADMIN — FENTANYL CITRATE 100 MCG: 50 INJECTION, SOLUTION INTRAMUSCULAR; INTRAVENOUS at 17:36

## 2024-01-01 RX ADMIN — GABAPENTIN 100 MG: 100 CAPSULE ORAL at 17:11

## 2024-01-01 RX ADMIN — BACLOFEN 10 MG: 10 TABLET ORAL at 12:06

## 2024-01-01 RX ADMIN — ACETAMINOPHEN 650 MG: 325 TABLET ORAL at 14:43

## 2024-01-01 RX ADMIN — QUETIAPINE FUMARATE 100 MG: 100 TABLET ORAL at 05:33

## 2024-01-01 RX ADMIN — SENNOSIDES AND DOCUSATE SODIUM 1 TABLET: 50; 8.6 TABLET ORAL at 20:03

## 2024-01-01 RX ADMIN — CLONIDINE HYDROCHLORIDE 0.1 MG: 0.1 TABLET ORAL at 05:32

## 2024-01-01 RX ADMIN — POTASSIUM PHOSPHATE, MONOBASIC AND POTASSIUM PHOSPHATE, DIBASIC 30 MMOL: 224; 236 INJECTION, SOLUTION, CONCENTRATE INTRAVENOUS at 11:56

## 2024-01-01 RX ADMIN — PROPOFOL 80 MCG/KG/MIN: 10 INJECTION, EMULSION INTRAVENOUS at 18:04

## 2024-01-01 RX ADMIN — SODIUM CHLORIDE 2 G: 1 TABLET ORAL at 21:42

## 2024-01-01 RX ADMIN — PROPOFOL 70 MCG/KG/MIN: 10 INJECTION, EMULSION INTRAVENOUS at 03:15

## 2024-01-01 RX ADMIN — FENTANYL CITRATE 100 MCG: 50 INJECTION, SOLUTION INTRAMUSCULAR; INTRAVENOUS at 20:55

## 2024-01-01 RX ADMIN — FENTANYL CITRATE 50 MCG: 50 INJECTION, SOLUTION INTRAMUSCULAR; INTRAVENOUS at 04:22

## 2024-01-01 RX ADMIN — INSULIN HUMAN 10 UNITS: 1 INJECTION, SOLUTION INTRAVENOUS at 04:52

## 2024-01-01 RX ADMIN — PROPOFOL 50 MCG/KG/MIN: 10 INJECTION, EMULSION INTRAVENOUS at 18:03

## 2024-01-01 RX ADMIN — ACETAMINOPHEN 1000 MG: 500 TABLET ORAL at 14:10

## 2024-01-01 RX ADMIN — METAXALONE 800 MG: 800 TABLET ORAL at 12:37

## 2024-01-01 RX ADMIN — OXYCODONE HYDROCHLORIDE 10 MG: 10 TABLET ORAL at 14:59

## 2024-01-01 RX ADMIN — METAXALONE 800 MG: 800 TABLET ORAL at 17:13

## 2024-01-01 RX ADMIN — POTASSIUM CHLORIDE 40 MEQ: 29.8 INJECTION, SOLUTION INTRAVENOUS at 10:20

## 2024-01-01 RX ADMIN — VECURONIUM BROMIDE 6.38 MG: 1 INJECTION, POWDER, LYOPHILIZED, FOR SOLUTION INTRAVENOUS at 06:26

## 2024-01-01 RX ADMIN — DOCUSATE SODIUM 100 MG: 50 LIQUID ORAL at 17:59

## 2024-01-01 RX ADMIN — METAXALONE 800 MG: 800 TABLET ORAL at 12:05

## 2024-01-01 RX ADMIN — LEVETIRACETAM 500 MG: 500 TABLET, FILM COATED ORAL at 17:12

## 2024-01-01 RX ADMIN — OXYCODONE HYDROCHLORIDE 10 MG: 10 TABLET ORAL at 10:58

## 2024-01-01 RX ADMIN — LEVETIRACETAM 500 MG: 500 TABLET, FILM COATED ORAL at 06:03

## 2024-01-01 RX ADMIN — OXYCODONE HYDROCHLORIDE 10 MG: 10 TABLET ORAL at 04:53

## 2024-01-01 RX ADMIN — POTASSIUM CHLORIDE 40 MEQ: 29.8 INJECTION, SOLUTION INTRAVENOUS at 22:20

## 2024-01-01 RX ADMIN — FENTANYL CITRATE 100 MCG: 50 INJECTION, SOLUTION INTRAMUSCULAR; INTRAVENOUS at 02:54

## 2024-01-01 RX ADMIN — LABETALOL HYDROCHLORIDE 10 MG: 5 INJECTION, SOLUTION INTRAVENOUS at 01:10

## 2024-01-01 RX ADMIN — PROPOFOL 50 MCG/KG/MIN: 10 INJECTION, EMULSION INTRAVENOUS at 20:03

## 2024-01-01 RX ADMIN — BACLOFEN 10 MG: 10 TABLET ORAL at 17:14

## 2024-01-01 RX ADMIN — METAXALONE 800 MG: 800 TABLET ORAL at 17:46

## 2024-01-01 RX ADMIN — INSULIN LISPRO 1 UNITS: 100 INJECTION, SOLUTION INTRAVENOUS; SUBCUTANEOUS at 06:12

## 2024-01-01 RX ADMIN — LABETALOL HYDROCHLORIDE 10 MG: 5 INJECTION, SOLUTION INTRAVENOUS at 19:47

## 2024-01-01 RX ADMIN — PROPRANOLOL HYDROCHLORIDE 10 MG: 10 TABLET ORAL at 21:47

## 2024-01-01 RX ADMIN — QUETIAPINE FUMARATE 100 MG: 100 TABLET ORAL at 14:10

## 2024-01-01 RX ADMIN — LEVETIRACETAM 500 MG: 500 TABLET, FILM COATED ORAL at 06:23

## 2024-01-01 RX ADMIN — BACLOFEN 10 MG: 10 TABLET ORAL at 05:05

## 2024-01-01 RX ADMIN — FENTANYL CITRATE 100 MCG: 50 INJECTION, SOLUTION INTRAMUSCULAR; INTRAVENOUS at 11:49

## 2024-01-01 RX ADMIN — SODIUM CHLORIDE 2 G: 1 TABLET ORAL at 07:51

## 2024-01-01 RX ADMIN — DOCUSATE SODIUM 100 MG: 50 LIQUID ORAL at 17:10

## 2024-01-01 RX ADMIN — DOCUSATE SODIUM 100 MG: 50 LIQUID ORAL at 17:14

## 2024-01-01 RX ADMIN — FENTANYL CITRATE 100 MCG: 50 INJECTION, SOLUTION INTRAMUSCULAR; INTRAVENOUS at 00:03

## 2024-01-01 RX ADMIN — PROPOFOL 80 MCG/KG/MIN: 10 INJECTION, EMULSION INTRAVENOUS at 15:20

## 2024-01-01 RX ADMIN — MIDAZOLAM HYDROCHLORIDE 2 MG: 1 INJECTION, SOLUTION INTRAMUSCULAR; INTRAVENOUS at 20:52

## 2024-01-01 RX ADMIN — METAXALONE 800 MG: 800 TABLET ORAL at 17:11

## 2024-01-01 RX ADMIN — SODIUM CHLORIDE: 9 INJECTION, SOLUTION INTRAVENOUS at 09:07

## 2024-01-01 RX ADMIN — SODIUM CHLORIDE 30 ML/HR: 3 INJECTION, SOLUTION INTRAVENOUS at 03:26

## 2024-01-01 RX ADMIN — POTASSIUM CHLORIDE 40 MEQ: 29.8 INJECTION, SOLUTION INTRAVENOUS at 00:23

## 2024-01-01 RX ADMIN — FAMOTIDINE 20 MG: 20 TABLET, FILM COATED ORAL at 17:14

## 2024-01-01 RX ADMIN — Medication 100 MCG/HR: at 12:25

## 2024-01-01 RX ADMIN — MINERAL OIL, PETROLATUM 1 APPLICATION: 425; 568 OINTMENT OPHTHALMIC at 22:39

## 2024-01-01 RX ADMIN — OXYCODONE HYDROCHLORIDE 10 MG: 10 TABLET ORAL at 05:05

## 2024-01-01 RX ADMIN — DEXTROSE MONOHYDRATE 25 G: 25 INJECTION, SOLUTION INTRAVENOUS at 04:52

## 2024-01-01 RX ADMIN — ACETAMINOPHEN 1000 MG: 500 TABLET ORAL at 13:31

## 2024-01-01 RX ADMIN — PROPOFOL 40 MCG/KG/MIN: 10 INJECTION, EMULSION INTRAVENOUS at 23:03

## 2024-01-01 RX ADMIN — DOCUSATE SODIUM 100 MG: 50 LIQUID ORAL at 05:32

## 2024-01-01 RX ADMIN — CLONIDINE HYDROCHLORIDE 0.1 MG: 0.1 TABLET ORAL at 12:06

## 2024-01-01 RX ADMIN — GABAPENTIN 100 MG: 100 CAPSULE ORAL at 17:13

## 2024-01-01 RX ADMIN — PROPRANOLOL HYDROCHLORIDE 10 MG: 10 TABLET ORAL at 14:10

## 2024-01-01 RX ADMIN — SODIUM CHLORIDE 200 MEQ: 4 INJECTION, SOLUTION, CONCENTRATE INTRAVENOUS at 20:27

## 2024-01-01 RX ADMIN — SODIUM CHLORIDE: 9 INJECTION, SOLUTION INTRAVENOUS at 00:27

## 2024-01-01 RX ADMIN — POLYETHYLENE GLYCOL 3350 1 PACKET: 17 POWDER, FOR SOLUTION ORAL at 05:05

## 2024-01-01 RX ADMIN — Medication 200 MCG/HR: at 18:07

## 2024-01-01 RX ADMIN — POTASSIUM CHLORIDE 40 MEQ: 29.8 INJECTION, SOLUTION INTRAVENOUS at 16:54

## 2024-01-01 RX ADMIN — PROPOFOL 80 MCG/KG/MIN: 10 INJECTION, EMULSION INTRAVENOUS at 09:25

## 2024-01-01 RX ADMIN — FAMOTIDINE 20 MG: 20 TABLET, FILM COATED ORAL at 06:22

## 2024-01-01 RX ADMIN — SODIUM CHLORIDE: 9 INJECTION, SOLUTION INTRAVENOUS at 00:13

## 2024-01-01 RX ADMIN — EPINEPHRINE 1 MG: 0.1 INJECTION, SOLUTION INTRAVENOUS at 06:40

## 2024-01-01 RX ADMIN — CLONIDINE HYDROCHLORIDE 0.1 MG: 0.1 TABLET ORAL at 05:05

## 2024-01-01 RX ADMIN — LEVETIRACETAM 500 MG: 100 INJECTION, SOLUTION INTRAVENOUS at 05:52

## 2024-01-01 RX ADMIN — PROPRANOLOL HYDROCHLORIDE 10 MG: 10 TABLET ORAL at 06:23

## 2024-01-01 RX ADMIN — PROPOFOL 80 MCG/KG/MIN: 10 INJECTION, EMULSION INTRAVENOUS at 22:57

## 2024-01-01 RX ADMIN — PROPOFOL 80 MCG/KG/MIN: 10 INJECTION, EMULSION INTRAVENOUS at 21:03

## 2024-01-01 RX ADMIN — MIDAZOLAM HYDROCHLORIDE 2 MG: 1 INJECTION, SOLUTION INTRAMUSCULAR; INTRAVENOUS at 00:36

## 2024-01-01 RX ADMIN — PROPOFOL 80 MCG/KG/MIN: 10 INJECTION, EMULSION INTRAVENOUS at 17:47

## 2024-01-01 RX ADMIN — SODIUM CHLORIDE 200 MEQ: 4 INJECTION, SOLUTION, CONCENTRATE INTRAVENOUS at 02:25

## 2024-01-01 RX ADMIN — FENTANYL CITRATE 50 MCG: 50 INJECTION, SOLUTION INTRAMUSCULAR; INTRAVENOUS at 01:30

## 2024-01-01 RX ADMIN — SODIUM CHLORIDE 60 ML/HR: 3 INJECTION, SOLUTION INTRAVENOUS at 00:30

## 2024-01-01 RX ADMIN — ACETAMINOPHEN 1000 MG: 500 TABLET ORAL at 12:40

## 2024-01-01 RX ADMIN — PROPOFOL 80 MCG/KG/MIN: 10 INJECTION, EMULSION INTRAVENOUS at 00:23

## 2024-01-01 RX ADMIN — MINERAL OIL, PETROLATUM 1 APPLICATION: 425; 568 OINTMENT OPHTHALMIC at 06:14

## 2024-01-01 RX ADMIN — CALCIUM CHLORIDE 1 G: 100 INJECTION, SOLUTION INTRAVENOUS; INTRAVENTRICULAR at 22:46

## 2024-01-01 RX ADMIN — POTASSIUM BICARBONATE 50 MEQ: 978 TABLET, EFFERVESCENT ORAL at 16:58

## 2024-01-01 RX ADMIN — PROPOFOL 50 MCG/KG/MIN: 10 INJECTION, EMULSION INTRAVENOUS at 03:21

## 2024-01-01 RX ADMIN — PROPOFOL 40 MCG/KG/MIN: 10 INJECTION, EMULSION INTRAVENOUS at 02:53

## 2024-01-01 RX ADMIN — SODIUM CHLORIDE 2 G: 1 TABLET ORAL at 21:47

## 2024-01-01 RX ADMIN — INSULIN LISPRO 1 UNITS: 100 INJECTION, SOLUTION INTRAVENOUS; SUBCUTANEOUS at 20:06

## 2024-01-01 RX ADMIN — BACLOFEN 10 MG: 10 TABLET ORAL at 05:33

## 2024-01-01 RX ADMIN — NOREPINEPHRINE BITARTRATE 0.9 MCG/KG/MIN: 1 INJECTION, SOLUTION, CONCENTRATE INTRAVENOUS at 23:48

## 2024-01-01 RX ADMIN — BACLOFEN 10 MG: 10 TABLET ORAL at 21:42

## 2024-01-01 RX ADMIN — MIDAZOLAM HYDROCHLORIDE 2 MG: 1 INJECTION, SOLUTION INTRAMUSCULAR; INTRAVENOUS at 06:13

## 2024-01-01 RX ADMIN — DIBASIC SODIUM PHOSPHATE, MONOBASIC POTASSIUM PHOSPHATE AND MONOBASIC SODIUM PHOSPHATE 500 MG: 852; 155; 130 TABLET ORAL at 17:13

## 2024-01-01 RX ADMIN — PROPOFOL 80 MCG/KG/MIN: 10 INJECTION, EMULSION INTRAVENOUS at 22:00

## 2024-01-01 RX ADMIN — INSULIN LISPRO 2 UNITS: 100 INJECTION, SOLUTION INTRAVENOUS; SUBCUTANEOUS at 13:03

## 2024-01-01 RX ADMIN — FENTANYL CITRATE 50 MCG: 50 INJECTION, SOLUTION INTRAMUSCULAR; INTRAVENOUS at 12:40

## 2024-01-01 RX ADMIN — GABAPENTIN 100 MG: 100 CAPSULE ORAL at 21:42

## 2024-01-01 RX ADMIN — VECURONIUM BROMIDE 6.38 MG: 1 INJECTION, POWDER, LYOPHILIZED, FOR SOLUTION INTRAVENOUS at 01:54

## 2024-01-01 RX ADMIN — MIDAZOLAM HYDROCHLORIDE 2 MG: 1 INJECTION, SOLUTION INTRAMUSCULAR; INTRAVENOUS at 02:55

## 2024-01-01 RX ADMIN — Medication 200 MCG/HR: at 18:21

## 2024-01-01 RX ADMIN — ACETAMINOPHEN 650 MG: 325 TABLET ORAL at 04:24

## 2024-01-01 RX ADMIN — QUETIAPINE FUMARATE 100 MG: 100 TABLET ORAL at 06:04

## 2024-01-01 RX ADMIN — PROPOFOL 80 MCG/KG/MIN: 10 INJECTION, EMULSION INTRAVENOUS at 06:12

## 2024-01-01 RX ADMIN — GABAPENTIN 100 MG: 100 CAPSULE ORAL at 06:23

## 2024-01-01 RX ADMIN — FENTANYL CITRATE 50 MCG: 50 INJECTION, SOLUTION INTRAMUSCULAR; INTRAVENOUS at 05:42

## 2024-01-01 RX ADMIN — ACETAMINOPHEN 1000 MG: 500 TABLET ORAL at 21:42

## 2024-01-01 RX ADMIN — SODIUM CHLORIDE 2 G: 1 TABLET ORAL at 12:37

## 2024-01-01 RX ADMIN — QUETIAPINE FUMARATE 100 MG: 100 TABLET ORAL at 05:05

## 2024-01-01 RX ADMIN — ACETAMINOPHEN 1000 MG: 500 TABLET ORAL at 13:25

## 2024-01-01 RX ADMIN — LEVETIRACETAM 500 MG: 500 TABLET, FILM COATED ORAL at 17:46

## 2024-01-01 RX ADMIN — FENTANYL CITRATE 50 MCG: 50 INJECTION, SOLUTION INTRAMUSCULAR; INTRAVENOUS at 20:18

## 2024-01-01 RX ADMIN — METAXALONE 800 MG: 800 TABLET ORAL at 21:42

## 2024-01-01 RX ADMIN — VECURONIUM BROMIDE 6.38 MG: 1 INJECTION, POWDER, LYOPHILIZED, FOR SOLUTION INTRAVENOUS at 03:59

## 2024-01-01 RX ADMIN — AMPICILLIN AND SULBACTAM 3 G: 1; 2 INJECTION, POWDER, FOR SOLUTION INTRAMUSCULAR; INTRAVENOUS at 11:37

## 2024-01-01 RX ADMIN — SODIUM CHLORIDE 2 G: 1 TABLET ORAL at 14:17

## 2024-01-01 RX ADMIN — DOCUSATE SODIUM 100 MG: 50 LIQUID ORAL at 06:22

## 2024-01-01 RX ADMIN — ACETAMINOPHEN 1000 MG: 500 TABLET ORAL at 06:22

## 2024-01-01 RX ADMIN — LEVETIRACETAM 500 MG: 500 TABLET, FILM COATED ORAL at 05:05

## 2024-01-01 RX ADMIN — PROPRANOLOL HYDROCHLORIDE 10 MG: 10 TABLET ORAL at 05:05

## 2024-01-01 RX ADMIN — MIDAZOLAM HYDROCHLORIDE 5 MG: 1 INJECTION, SOLUTION INTRAMUSCULAR; INTRAVENOUS at 12:12

## 2024-01-01 RX ADMIN — PROPRANOLOL HYDROCHLORIDE 10 MG: 10 TABLET ORAL at 05:32

## 2024-01-01 RX ADMIN — POLYETHYLENE GLYCOL 3350 1 PACKET: 17 POWDER, FOR SOLUTION ORAL at 05:32

## 2024-01-01 RX ADMIN — PROPRANOLOL HYDROCHLORIDE 1 MG: 1 INJECTION INTRAVENOUS at 06:00

## 2024-01-01 RX ADMIN — BACLOFEN 10 MG: 10 TABLET ORAL at 11:49

## 2024-01-01 RX ADMIN — MAGNESIUM HYDROXIDE 30 ML: 1200 LIQUID ORAL at 17:11

## 2024-01-01 RX ADMIN — FAMOTIDINE 20 MG: 20 TABLET, FILM COATED ORAL at 17:59

## 2024-01-01 RX ADMIN — PROPOFOL 80 MCG/KG/MIN: 10 INJECTION, EMULSION INTRAVENOUS at 02:03

## 2024-01-01 RX ADMIN — QUETIAPINE FUMARATE 100 MG: 100 TABLET ORAL at 22:19

## 2024-01-01 RX ADMIN — POLYETHYLENE GLYCOL 3350 1 PACKET: 17 POWDER, FOR SOLUTION ORAL at 17:11

## 2024-01-01 RX ADMIN — PROPOFOL 80 MCG/KG/MIN: 10 INJECTION, EMULSION INTRAVENOUS at 15:38

## 2024-01-01 RX ADMIN — CLONIDINE HYDROCHLORIDE 0.1 MG: 0.1 TABLET ORAL at 17:11

## 2024-01-01 RX ADMIN — CLONIDINE HYDROCHLORIDE 0.1 MG: 0.1 TABLET ORAL at 17:20

## 2024-01-01 RX ADMIN — PROPOFOL 80 MCG/KG/MIN: 10 INJECTION, EMULSION INTRAVENOUS at 11:18

## 2024-01-01 RX ADMIN — SODIUM CHLORIDE 60 ML/HR: 3 INJECTION, SOLUTION INTRAVENOUS at 00:31

## 2024-01-01 RX ADMIN — ACETAMINOPHEN 650 MG: 650 SUPPOSITORY RECTAL at 03:59

## 2024-01-01 RX ADMIN — ACETAMINOPHEN 1000 MG: 500 TABLET ORAL at 14:16

## 2024-01-01 RX ADMIN — SODIUM CHLORIDE: 9 INJECTION, SOLUTION INTRAVENOUS at 08:36

## 2024-01-01 RX ADMIN — DIBASIC SODIUM PHOSPHATE, MONOBASIC POTASSIUM PHOSPHATE AND MONOBASIC SODIUM PHOSPHATE 500 MG: 852; 155; 130 TABLET ORAL at 12:06

## 2024-01-01 RX ADMIN — SODIUM CHLORIDE 2 G: 1 TABLET ORAL at 22:19

## 2024-01-01 RX ADMIN — GABAPENTIN 100 MG: 100 CAPSULE ORAL at 12:40

## 2024-01-01 RX ADMIN — FUROSEMIDE 1 MG/HR: 10 INJECTION, SOLUTION INTRAVENOUS at 23:35

## 2024-01-01 RX ADMIN — LEVETIRACETAM 500 MG: 500 TABLET, FILM COATED ORAL at 17:14

## 2024-01-01 RX ADMIN — PROPOFOL 80 MCG/KG/MIN: 10 INJECTION, EMULSION INTRAVENOUS at 11:10

## 2024-01-01 RX ADMIN — OXYCODONE HYDROCHLORIDE 10 MG: 10 TABLET ORAL at 13:57

## 2024-01-01 RX ADMIN — MIDAZOLAM HYDROCHLORIDE 5 MG: 1 INJECTION INTRAMUSCULAR; INTRAVENOUS at 12:12

## 2024-01-01 RX ADMIN — METAXALONE 800 MG: 800 TABLET ORAL at 06:03

## 2024-01-01 RX ADMIN — METAXALONE 800 MG: 800 TABLET ORAL at 05:32

## 2024-01-01 RX ADMIN — PROPRANOLOL HYDROCHLORIDE 10 MG: 10 TABLET ORAL at 21:42

## 2024-01-01 RX ADMIN — FAMOTIDINE 20 MG: 10 INJECTION, SOLUTION INTRAVENOUS at 05:53

## 2024-01-01 RX ADMIN — GABAPENTIN 100 MG: 100 CAPSULE ORAL at 05:33

## 2024-01-01 RX ADMIN — QUETIAPINE FUMARATE 100 MG: 100 TABLET ORAL at 21:42

## 2024-01-01 RX ADMIN — SODIUM CHLORIDE 50 ML/HR: 3 INJECTION, SOLUTION INTRAVENOUS at 16:05

## 2024-01-01 RX ADMIN — IOHEXOL 80 ML: 350 INJECTION, SOLUTION INTRAVENOUS at 02:00

## 2024-01-01 RX ADMIN — Medication 50 MCG/HR: at 01:01

## 2024-01-01 RX ADMIN — LABETALOL HYDROCHLORIDE 10 MG: 5 INJECTION, SOLUTION INTRAVENOUS at 18:35

## 2024-01-01 RX ADMIN — CALCIUM GLUCONATE 2 G: 20 INJECTION, SOLUTION INTRAVENOUS at 04:30

## 2024-01-01 RX ADMIN — QUETIAPINE FUMARATE 100 MG: 100 TABLET ORAL at 17:46

## 2024-01-01 RX ADMIN — LABETALOL HYDROCHLORIDE 20 MG: 5 INJECTION, SOLUTION INTRAVENOUS at 03:12

## 2024-01-01 RX ADMIN — HYDRALAZINE HYDROCHLORIDE 20 MG: 20 INJECTION INTRAMUSCULAR; INTRAVENOUS at 22:11

## 2024-01-01 RX ADMIN — FENTANYL CITRATE 50 MCG: 50 INJECTION, SOLUTION INTRAMUSCULAR; INTRAVENOUS at 03:56

## 2024-01-01 RX ADMIN — MINERAL OIL, PETROLATUM 1 APPLICATION: 425; 568 OINTMENT OPHTHALMIC at 14:00

## 2024-01-01 RX ADMIN — VECURONIUM BROMIDE 6.38 MG: 1 INJECTION, POWDER, LYOPHILIZED, FOR SOLUTION INTRAVENOUS at 03:10

## 2024-01-01 RX ADMIN — METAXALONE 800 MG: 800 TABLET ORAL at 17:52

## 2024-01-01 RX ADMIN — EPINEPHRINE 1 MG: 0.1 INJECTION, SOLUTION INTRAVENOUS at 06:43

## 2024-01-01 RX ADMIN — MAGNESIUM HYDROXIDE 30 ML: 1200 LIQUID ORAL at 17:52

## 2024-01-01 RX ADMIN — CLONIDINE HYDROCHLORIDE 0.1 MG: 0.1 TABLET ORAL at 17:59

## 2024-01-01 RX ADMIN — PROPOFOL 80 MCG/KG/MIN: 10 INJECTION, EMULSION INTRAVENOUS at 08:13

## 2024-01-01 RX ADMIN — VECURONIUM BROMIDE 1 MCG/KG/MIN: 1 INJECTION, POWDER, LYOPHILIZED, FOR SOLUTION INTRAVENOUS at 03:40

## 2024-01-01 RX ADMIN — FENTANYL CITRATE 100 MCG: 50 INJECTION, SOLUTION INTRAMUSCULAR; INTRAVENOUS at 12:06

## 2024-01-01 RX ADMIN — SENNOSIDES AND DOCUSATE SODIUM 1 TABLET: 50; 8.6 TABLET ORAL at 21:47

## 2024-01-01 RX ADMIN — PROPOFOL 80 MCG/KG/MIN: 10 INJECTION, EMULSION INTRAVENOUS at 12:07

## 2024-01-01 RX ADMIN — FENTANYL CITRATE 50 MCG: 50 INJECTION, SOLUTION INTRAMUSCULAR; INTRAVENOUS at 10:17

## 2024-01-01 RX ADMIN — ACETAMINOPHEN 1000 MG: 500 TABLET ORAL at 21:27

## 2024-01-01 RX ADMIN — FENTANYL CITRATE 100 MCG: 50 INJECTION, SOLUTION INTRAMUSCULAR; INTRAVENOUS at 09:24

## 2024-01-01 RX ADMIN — INSULIN LISPRO 1 UNITS: 100 INJECTION, SOLUTION INTRAVENOUS; SUBCUTANEOUS at 06:03

## 2024-01-01 RX ADMIN — GABAPENTIN 100 MG: 100 CAPSULE ORAL at 12:06

## 2024-01-01 RX ADMIN — EPOPROSTENOL 0.05 MCG/KG/MIN: 1.5 INJECTION, POWDER, LYOPHILIZED, FOR SOLUTION INTRAVENOUS at 04:14

## 2024-01-01 RX ADMIN — FENTANYL CITRATE 50 MCG: 50 INJECTION, SOLUTION INTRAMUSCULAR; INTRAVENOUS at 07:03

## 2024-01-01 RX ADMIN — SODIUM CHLORIDE 40 ML/HR: 3 INJECTION, SOLUTION INTRAVENOUS at 20:17

## 2024-01-01 RX ADMIN — POLYETHYLENE GLYCOL 3350 1 PACKET: 17 POWDER, FOR SOLUTION ORAL at 17:59

## 2024-01-01 RX ADMIN — OXYCODONE HYDROCHLORIDE 10 MG: 10 TABLET ORAL at 22:19

## 2024-01-01 RX ADMIN — SODIUM CHLORIDE 200 MEQ: 4 INJECTION, SOLUTION, CONCENTRATE INTRAVENOUS at 14:13

## 2024-01-01 RX ADMIN — MAGNESIUM HYDROXIDE 30 ML: 1200 LIQUID ORAL at 17:59

## 2024-01-01 RX ADMIN — MIDAZOLAM HYDROCHLORIDE 2 MG: 1 INJECTION, SOLUTION INTRAMUSCULAR; INTRAVENOUS at 05:23

## 2024-01-01 RX ADMIN — FENTANYL CITRATE 50 MCG: 50 INJECTION, SOLUTION INTRAMUSCULAR; INTRAVENOUS at 11:41

## 2024-01-01 RX ADMIN — SODIUM CHLORIDE 2 G: 1 TABLET ORAL at 05:33

## 2024-01-01 RX ADMIN — VECURONIUM BROMIDE 1.2 MCG/KG/MIN: 1 INJECTION, POWDER, LYOPHILIZED, FOR SOLUTION INTRAVENOUS at 15:43

## 2024-01-01 RX ADMIN — VECURONIUM BROMIDE 1.2 MCG/KG/MIN: 1 INJECTION, POWDER, LYOPHILIZED, FOR SOLUTION INTRAVENOUS at 05:28

## 2024-01-01 RX ADMIN — PROPRANOLOL HYDROCHLORIDE 10 MG: 10 TABLET ORAL at 22:19

## 2024-01-01 RX ADMIN — FENTANYL CITRATE 100 MCG: 50 INJECTION, SOLUTION INTRAMUSCULAR; INTRAVENOUS at 22:11

## 2024-01-01 RX ADMIN — PROPOFOL 80 MCG/KG/MIN: 10 INJECTION, EMULSION INTRAVENOUS at 01:08

## 2024-01-01 RX ADMIN — QUETIAPINE FUMARATE 100 MG: 100 TABLET ORAL at 06:23

## 2024-01-01 RX ADMIN — PROPOFOL 80 MCG/KG/MIN: 10 INJECTION, EMULSION INTRAVENOUS at 04:17

## 2024-01-01 RX ADMIN — VECURONIUM BROMIDE 6.38 MG: 1 INJECTION, POWDER, LYOPHILIZED, FOR SOLUTION INTRAVENOUS at 04:56

## 2024-01-01 RX ADMIN — POLYETHYLENE GLYCOL 3350 1 PACKET: 17 POWDER, FOR SOLUTION ORAL at 17:46

## 2024-01-01 RX ADMIN — PROPRANOLOL HYDROCHLORIDE 10 MG: 10 TABLET ORAL at 14:17

## 2024-01-01 RX ADMIN — HYDRALAZINE HYDROCHLORIDE 20 MG: 20 INJECTION INTRAMUSCULAR; INTRAVENOUS at 20:19

## 2024-01-01 RX ADMIN — SENNOSIDES AND DOCUSATE SODIUM 1 TABLET: 50; 8.6 TABLET ORAL at 21:43

## 2024-01-01 RX ADMIN — POTASSIUM CHLORIDE 20 MEQ: 14.9 INJECTION, SOLUTION INTRAVENOUS at 01:54

## 2024-01-01 RX ADMIN — QUETIAPINE FUMARATE 100 MG: 100 TABLET ORAL at 21:41

## 2024-01-01 RX ADMIN — LEVETIRACETAM 500 MG: 500 TABLET, FILM COATED ORAL at 17:59

## 2024-01-01 RX ADMIN — FUROSEMIDE 20 MG: 20 TABLET ORAL at 11:52

## 2024-01-01 RX ADMIN — POLYETHYLENE GLYCOL 3350 1 PACKET: 17 POWDER, FOR SOLUTION ORAL at 06:23

## 2024-01-01 RX ADMIN — MIDAZOLAM HYDROCHLORIDE 2 MG: 1 INJECTION, SOLUTION INTRAMUSCULAR; INTRAVENOUS at 04:53

## 2024-01-01 RX ADMIN — SODIUM CHLORIDE 1000 ML: 9 INJECTION, SOLUTION INTRAVENOUS at 12:41

## 2024-01-01 RX ADMIN — Medication 200 MCG/HR: at 17:32

## 2024-01-01 RX ADMIN — SENNOSIDES AND DOCUSATE SODIUM 1 TABLET: 50; 8.6 TABLET ORAL at 21:41

## 2024-01-01 RX ADMIN — FENTANYL CITRATE 100 MCG: 50 INJECTION, SOLUTION INTRAMUSCULAR; INTRAVENOUS at 21:38

## 2024-01-01 RX ADMIN — AMPICILLIN AND SULBACTAM 3 G: 1; 2 INJECTION, POWDER, FOR SOLUTION INTRAMUSCULAR; INTRAVENOUS at 00:59

## 2024-01-01 RX ADMIN — LEVETIRACETAM 500 MG: 500 TABLET, FILM COATED ORAL at 17:52

## 2024-01-01 RX ADMIN — GABAPENTIN 100 MG: 100 CAPSULE ORAL at 17:52

## 2024-01-01 RX ADMIN — SODIUM CHLORIDE 1000 ML: 9 INJECTION, SOLUTION INTRAVENOUS at 02:30

## 2024-01-01 RX ADMIN — QUETIAPINE FUMARATE 100 MG: 100 TABLET ORAL at 21:47

## 2024-01-01 RX ADMIN — FUROSEMIDE 20 MG: 10 INJECTION, SOLUTION INTRAVENOUS at 03:30

## 2024-01-01 RX ADMIN — Medication 200 MCG/HR: at 06:37

## 2024-01-01 RX ADMIN — PROPOFOL 50 MCG/KG/MIN: 10 INJECTION, EMULSION INTRAVENOUS at 08:21

## 2024-01-01 RX ADMIN — FENTANYL CITRATE 50 MCG: 50 INJECTION, SOLUTION INTRAMUSCULAR; INTRAVENOUS at 03:10

## 2024-01-01 RX ADMIN — Medication 125 MCG/HR: at 06:48

## 2024-01-01 RX ADMIN — FAMOTIDINE 20 MG: 20 TABLET, FILM COATED ORAL at 17:11

## 2024-01-01 RX ADMIN — FAMOTIDINE 20 MG: 20 TABLET, FILM COATED ORAL at 06:00

## 2024-01-01 RX ADMIN — ACETAMINOPHEN 1000 MG: 500 TABLET ORAL at 22:19

## 2024-01-01 RX ADMIN — BACLOFEN 10 MG: 10 TABLET ORAL at 11:52

## 2024-01-01 RX ADMIN — MIDAZOLAM HYDROCHLORIDE 2 MG: 1 INJECTION, SOLUTION INTRAMUSCULAR; INTRAVENOUS at 01:37

## 2024-01-01 RX ADMIN — NOREPINEPHRINE BITARTRATE 0.65 MCG/KG/MIN: 0.06 INJECTION, SOLUTION INTRAVENOUS at 02:13

## 2024-01-01 RX ADMIN — POTASSIUM CHLORIDE 20 MEQ: 14.9 INJECTION, SOLUTION INTRAVENOUS at 13:32

## 2024-01-01 RX ADMIN — METAXALONE 800 MG: 800 TABLET ORAL at 11:52

## 2024-01-01 RX ADMIN — ACETAMINOPHEN 650 MG: 325 TABLET ORAL at 10:13

## 2024-01-01 RX ADMIN — PROPOFOL 80 MCG/KG/MIN: 10 INJECTION, EMULSION INTRAVENOUS at 12:29

## 2024-01-01 RX ADMIN — QUETIAPINE FUMARATE 100 MG: 100 TABLET ORAL at 12:40

## 2024-01-01 RX ADMIN — DOCUSATE SODIUM 100 MG: 50 LIQUID ORAL at 17:46

## 2024-01-01 RX ADMIN — PROPOFOL 80 MCG/KG/MIN: 10 INJECTION, EMULSION INTRAVENOUS at 05:19

## 2024-01-01 RX ADMIN — PROPOFOL 80 MCG/KG/MIN: 10 INJECTION, EMULSION INTRAVENOUS at 14:01

## 2024-01-01 RX ADMIN — ACETAMINOPHEN 1000 MG: 500 TABLET ORAL at 21:40

## 2024-01-01 RX ADMIN — INSULIN LISPRO 1 UNITS: 100 INJECTION, SOLUTION INTRAVENOUS; SUBCUTANEOUS at 17:22

## 2024-01-01 RX ADMIN — GABAPENTIN 100 MG: 100 CAPSULE ORAL at 11:52

## 2024-01-01 RX ADMIN — Medication 100 MCG/HR: at 18:06

## 2024-01-01 RX ADMIN — FAMOTIDINE 20 MG: 20 TABLET, FILM COATED ORAL at 06:03

## 2024-01-01 RX ADMIN — DOCUSATE SODIUM 100 MG: 50 LIQUID ORAL at 17:52

## 2024-01-01 RX ADMIN — GABAPENTIN 100 MG: 100 CAPSULE ORAL at 05:05

## 2024-01-01 RX ADMIN — VASOPRESSIN 0.03 UNITS/MIN: 0.2 SOLUTION INTRAVENOUS at 23:25

## 2024-01-01 RX ADMIN — FENTANYL CITRATE 100 MCG: 50 INJECTION, SOLUTION INTRAMUSCULAR; INTRAVENOUS at 16:19

## 2024-01-01 RX ADMIN — DOCUSATE SODIUM 100 MG: 50 LIQUID ORAL at 06:04

## 2024-01-01 RX ADMIN — INSULIN LISPRO 1 UNITS: 100 INJECTION, SOLUTION INTRAVENOUS; SUBCUTANEOUS at 06:14

## 2024-01-01 RX ADMIN — PROPRANOLOL HYDROCHLORIDE 10 MG: 10 TABLET ORAL at 13:26

## 2024-01-01 RX ADMIN — ACETAMINOPHEN 1000 MG: 500 TABLET ORAL at 21:47

## 2024-01-01 RX ADMIN — INSULIN LISPRO 1 UNITS: 100 INJECTION, SOLUTION INTRAVENOUS; SUBCUTANEOUS at 00:27

## 2024-01-01 RX ADMIN — FENTANYL CITRATE 100 MCG: 50 INJECTION, SOLUTION INTRAMUSCULAR; INTRAVENOUS at 01:05

## 2024-01-01 RX ADMIN — QUETIAPINE FUMARATE 100 MG: 100 TABLET ORAL at 13:31

## 2024-01-01 RX ADMIN — Medication 200 MCG/HR: at 06:20

## 2024-01-01 RX ADMIN — FENTANYL CITRATE 100 MCG: 50 INJECTION, SOLUTION INTRAMUSCULAR; INTRAVENOUS at 21:05

## 2024-01-01 RX ADMIN — PROPOFOL 50 MCG/KG/MIN: 10 INJECTION, EMULSION INTRAVENOUS at 13:08

## 2024-01-01 RX ADMIN — LEVETIRACETAM 500 MG: 500 TABLET, FILM COATED ORAL at 05:33

## 2024-01-01 RX ADMIN — SODIUM CHLORIDE 30 ML/HR: 3 INJECTION, SOLUTION INTRAVENOUS at 18:22

## 2024-01-01 RX ADMIN — MAGNESIUM HYDROXIDE 30 ML: 1200 LIQUID ORAL at 17:14

## 2024-01-01 RX ADMIN — GABAPENTIN 100 MG: 100 CAPSULE ORAL at 17:46

## 2024-01-01 RX ADMIN — METAXALONE 800 MG: 800 TABLET ORAL at 05:05

## 2024-01-01 RX ADMIN — PROPOFOL 20 MG: 10 INJECTION, EMULSION INTRAVENOUS at 23:00

## 2024-01-01 RX ADMIN — QUETIAPINE FUMARATE 100 MG: 100 TABLET ORAL at 13:26

## 2024-01-01 RX ADMIN — FENTANYL CITRATE 100 MCG: 50 INJECTION, SOLUTION INTRAMUSCULAR; INTRAVENOUS at 19:17

## 2024-01-01 RX ADMIN — SODIUM CHLORIDE 60 ML/HR: 3 INJECTION, SOLUTION INTRAVENOUS at 08:38

## 2024-01-01 RX ADMIN — PROPOFOL 80 MCG/KG/MIN: 10 INJECTION, EMULSION INTRAVENOUS at 03:26

## 2024-01-01 RX ADMIN — FENTANYL CITRATE 100 MCG: 50 INJECTION, SOLUTION INTRAMUSCULAR; INTRAVENOUS at 04:23

## 2024-01-01 RX ADMIN — DOCUSATE SODIUM 100 MG: 50 LIQUID ORAL at 05:05

## 2024-01-01 RX ADMIN — HYDRALAZINE HYDROCHLORIDE 10 MG: 20 INJECTION INTRAMUSCULAR; INTRAVENOUS at 21:42

## 2024-01-01 RX ADMIN — PROPOFOL 80 MCG/KG/MIN: 10 INJECTION, EMULSION INTRAVENOUS at 09:02

## 2024-01-01 RX ADMIN — HYDRALAZINE HYDROCHLORIDE 20 MG: 20 INJECTION INTRAMUSCULAR; INTRAVENOUS at 02:39

## 2024-01-01 RX ADMIN — METAXALONE 800 MG: 800 TABLET ORAL at 12:40

## 2024-01-01 RX ADMIN — GABAPENTIN 100 MG: 100 CAPSULE ORAL at 11:49

## 2024-01-01 RX ADMIN — PROPOFOL 80 MCG/KG/MIN: 10 INJECTION, EMULSION INTRAVENOUS at 13:58

## 2024-01-01 RX ADMIN — OXYCODONE HYDROCHLORIDE 10 MG: 10 TABLET ORAL at 01:33

## 2024-01-01 RX ADMIN — FAMOTIDINE 20 MG: 20 TABLET, FILM COATED ORAL at 05:33

## 2024-01-01 RX ADMIN — MIDAZOLAM HYDROCHLORIDE 1 MG: 1 INJECTION, SOLUTION INTRAMUSCULAR; INTRAVENOUS at 12:24

## 2024-01-01 RX ADMIN — SODIUM CHLORIDE 1000 ML: 9 INJECTION, SOLUTION INTRAVENOUS at 23:56

## 2024-01-01 RX ADMIN — EPOPROSTENOL 0.05 MCG/KG/MIN: 1.5 INJECTION, POWDER, LYOPHILIZED, FOR SOLUTION INTRAVENOUS at 23:11

## 2024-01-01 RX ADMIN — MIDAZOLAM HYDROCHLORIDE 2 MG: 1 INJECTION, SOLUTION INTRAMUSCULAR; INTRAVENOUS at 22:26

## 2024-01-01 RX ADMIN — POLYETHYLENE GLYCOL 3350 1 PACKET: 17 POWDER, FOR SOLUTION ORAL at 17:14

## 2024-01-01 RX ADMIN — SODIUM CHLORIDE: 9 INJECTION, SOLUTION INTRAVENOUS at 17:04

## 2024-01-01 RX ADMIN — AMPICILLIN AND SULBACTAM 3 G: 1; 2 INJECTION, POWDER, FOR SOLUTION INTRAMUSCULAR; INTRAVENOUS at 05:57

## 2024-01-01 RX ADMIN — PROPOFOL 80 MCG/KG/MIN: 10 INJECTION, EMULSION INTRAVENOUS at 08:11

## 2024-01-01 RX ADMIN — QUETIAPINE FUMARATE 100 MG: 100 TABLET ORAL at 14:17

## 2024-01-01 RX ADMIN — PROPRANOLOL HYDROCHLORIDE 10 MG: 10 TABLET ORAL at 21:41

## 2024-01-01 RX ADMIN — SODIUM BICARBONATE 50 MEQ: 84 INJECTION, SOLUTION INTRAVENOUS at 06:44

## 2024-01-01 RX ADMIN — OXYCODONE HYDROCHLORIDE 10 MG: 10 TABLET ORAL at 21:48

## 2024-01-01 RX ADMIN — ACETAMINOPHEN 1000 MG: 500 TABLET ORAL at 05:32

## 2024-01-01 RX ADMIN — OXYCODONE HYDROCHLORIDE 10 MG: 10 TABLET ORAL at 01:20

## 2024-01-01 RX ADMIN — EPINEPHRINE 1 MG: 0.1 INJECTION, SOLUTION INTRAVENOUS at 06:47

## 2024-01-01 RX ADMIN — ACETAMINOPHEN 1000 MG: 500 TABLET ORAL at 05:05

## 2024-01-01 RX ADMIN — OXYCODONE HYDROCHLORIDE 10 MG: 10 TABLET ORAL at 17:11

## 2024-01-01 RX ADMIN — METAXALONE 800 MG: 800 TABLET ORAL at 06:23

## 2024-01-01 RX ADMIN — MAGNESIUM SULFATE HEPTAHYDRATE 2 G: 2 INJECTION, SOLUTION INTRAVENOUS at 23:42

## 2024-01-01 RX ADMIN — PROPOFOL 40 MCG/KG/MIN: 10 INJECTION, EMULSION INTRAVENOUS at 23:34

## 2024-01-01 RX ADMIN — SENNOSIDES AND DOCUSATE SODIUM 1 TABLET: 50; 8.6 TABLET ORAL at 00:24

## 2024-01-01 RX ADMIN — CALCIUM CHLORIDE 2 G: 100 INJECTION, SOLUTION INTRAVENOUS; INTRAVENTRICULAR at 06:40

## 2024-01-01 RX ADMIN — INSULIN LISPRO 2 UNITS: 100 INJECTION, SOLUTION INTRAVENOUS; SUBCUTANEOUS at 02:00

## 2024-01-01 RX ADMIN — SODIUM CHLORIDE 60 ML/HR: 3 INJECTION, SOLUTION INTRAVENOUS at 17:25

## 2024-01-01 RX ADMIN — Medication 100 MCG/HR: at 23:46

## 2024-01-01 RX ADMIN — FAMOTIDINE 20 MG: 20 TABLET, FILM COATED ORAL at 17:52

## 2024-01-01 RX ADMIN — GABAPENTIN 100 MG: 100 CAPSULE ORAL at 06:03

## 2024-01-01 RX ADMIN — POTASSIUM PHOSPHATE, MONOBASIC AND POTASSIUM PHOSPHATE, DIBASIC 30 MMOL: 224; 236 INJECTION, SOLUTION, CONCENTRATE INTRAVENOUS at 02:00

## 2024-01-01 RX ADMIN — FAMOTIDINE 20 MG: 20 TABLET, FILM COATED ORAL at 17:46

## 2024-01-01 RX ADMIN — BACLOFEN 10 MG: 10 TABLET ORAL at 17:11

## 2024-01-01 RX ADMIN — PROPOFOL 50 MCG/KG/MIN: 10 INJECTION, EMULSION INTRAVENOUS at 00:58

## 2024-01-01 RX ADMIN — SODIUM BICARBONATE 50 MEQ: 84 INJECTION, SOLUTION INTRAVENOUS at 22:47

## 2024-01-01 RX ADMIN — FENTANYL CITRATE 100 MCG: 50 INJECTION, SOLUTION INTRAMUSCULAR; INTRAVENOUS at 14:28

## 2024-01-01 RX ADMIN — EPINEPHRINE 1 MG: 0.1 INJECTION, SOLUTION INTRAVENOUS at 22:45

## 2024-01-01 RX ADMIN — METAXALONE 800 MG: 800 TABLET ORAL at 11:49

## 2024-01-01 RX ADMIN — PROPOFOL 80 MCG/KG/MIN: 10 INJECTION, EMULSION INTRAVENOUS at 17:13

## 2024-01-01 RX ADMIN — LABETALOL HYDROCHLORIDE 20 MG: 5 INJECTION, SOLUTION INTRAVENOUS at 01:29

## 2024-01-01 RX ADMIN — SODIUM CHLORIDE 60 ML/HR: 3 INJECTION, SOLUTION INTRAVENOUS at 10:00

## 2024-01-01 RX ADMIN — PROPOFOL 80 MCG/KG/MIN: 10 INJECTION, EMULSION INTRAVENOUS at 18:20

## 2024-01-01 RX ADMIN — GABAPENTIN 100 MG: 100 CAPSULE ORAL at 12:37

## 2024-01-01 RX ADMIN — FENTANYL CITRATE 100 MCG: 50 INJECTION, SOLUTION INTRAMUSCULAR; INTRAVENOUS at 04:53

## 2024-01-01 RX ADMIN — PROPOFOL 80 MCG/KG/MIN: 10 INJECTION, EMULSION INTRAVENOUS at 05:28

## 2024-01-01 RX ADMIN — FENTANYL CITRATE 50 MCG: 50 INJECTION, SOLUTION INTRAMUSCULAR; INTRAVENOUS at 11:09

## 2024-01-01 RX ADMIN — OXYCODONE HYDROCHLORIDE 10 MG: 10 TABLET ORAL at 14:10

## 2024-01-01 ASSESSMENT — PAIN DESCRIPTION - PAIN TYPE
TYPE: ACUTE PAIN

## 2024-01-01 ASSESSMENT — FIBROSIS 4 INDEX
FIB4 SCORE: 2.15
FIB4 SCORE: 2.2
FIB4 SCORE: 1.16
FIB4 SCORE: 3.97

## 2024-01-01 ASSESSMENT — LIFESTYLE VARIABLES: REASON UNABLE TO ASSESS: PATIENT INTUBATED AND SEDATED

## 2024-11-15 PROBLEM — T14.90XA TRAUMA: Status: ACTIVE | Noted: 2024-01-01

## 2024-11-16 PROBLEM — Z53.09 CONTRAINDICATION TO DEEP VEIN THROMBOSIS (DVT) PROPHYLAXIS: Status: ACTIVE | Noted: 2024-01-01

## 2024-11-16 PROBLEM — S02.109A: Status: ACTIVE | Noted: 2024-01-01

## 2024-11-16 PROBLEM — S27.322A BILATERAL PULMONARY CONTUSION: Status: ACTIVE | Noted: 2024-01-01

## 2024-11-16 PROBLEM — S06.30AA INTRACRANIAL HEMORRHAGE FOLLOWING INJURY (HCC): Status: ACTIVE | Noted: 2024-01-01

## 2024-11-16 PROBLEM — S22.059A: Status: ACTIVE | Noted: 2024-01-01

## 2024-11-16 PROBLEM — S22.009A CLOSED FRACTURE OF THORACIC VERTEBRAL BODY (HCC): Status: ACTIVE | Noted: 2024-01-01

## 2024-11-16 PROBLEM — S22.008A CLOSED FRACTURE OF SPINOUS PROCESS OF THORACIC VERTEBRA (HCC): Status: ACTIVE | Noted: 2024-01-01

## 2024-11-16 PROBLEM — S22.43XA MULTIPLE FRACTURES OF RIBS, BILATERAL, INITIAL ENCOUNTER FOR CLOSED FRACTURE: Status: ACTIVE | Noted: 2024-01-01

## 2024-11-16 PROBLEM — J96.90 RESPIRATORY FAILURE FOLLOWING TRAUMA (HCC): Status: ACTIVE | Noted: 2024-01-01

## 2024-11-16 PROBLEM — J93.9 PNEUMOTHORAX ON RIGHT: Status: ACTIVE | Noted: 2024-01-01

## 2024-11-16 PROBLEM — S02.92XA EXTENSIVE FACIAL FRACTURES (HCC): Status: ACTIVE | Noted: 2024-01-01

## 2024-11-16 PROBLEM — S32.009A LUMBAR TRANSVERSE PROCESS FRACTURE, CLOSED, INITIAL ENCOUNTER (HCC): Status: ACTIVE | Noted: 2024-01-01

## 2024-11-16 PROBLEM — S22.22XA FRACTURE OF BODY OF STERNUM: Status: ACTIVE | Noted: 2024-01-01
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
## 2024-11-16 NOTE — ASSESSMENT & PLAN NOTE
T5 and T12 compression fractures.  Non-operative management.   No bracing required.  Benito Waters MD. Neurosurgeon. [facility].

## 2024-11-16 NOTE — ASSESSMENT & PLAN NOTE
Small right pneumothorax   Chest tube placed at referring facility. -20 cm.  11/17 Chest tube to waterseal.  Aggressive pulmonary hygiene. Serial chest radiographs.

## 2024-11-16 NOTE — ASSESSMENT & PLAN NOTE
Left basilar skull fracture. Fracture of left temporal and left parietal bones.  Non operative management.  Benito Waters MD. Neurosurgeon. [facility].
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
## 2024-11-16 NOTE — PROGRESS NOTES
2300 Patient Arrived to unit.     2330 Patient Right pupil ovel and sluggish, left pupil sluggish and round and reactive MD Baltazar notified at 0007.     2340 25 serosanguinous out of chest tube,  chest t tube collection container replaced with [facility] yasmin dry suction collection contrianer. 45 ML out of old bowman, had to remove due to [facility] policy, new one ordered per provider, New bowman inserted, see LDA.    2349 lactic 4.0 critical, name kitty. MD Baltazar notified, orders received.     0009 18 R wrist, sedation off sice 0005, not following, 5/5 streghth, pt very combative off sedation when completing neuro checks.     0023 serosanguinous output, 25mL out of old chest tube.     0109 critical troponin 180, called in to this Rn by Kitty. MD notified , orders received.     0230 MD Waters at bedside, assessed patient.     0300 Patient family at bedside. Updated on interventions and plan of care, no further needs at this time.

## 2024-11-16 NOTE — ASSESSMENT & PLAN NOTE
L1 transverse process fracture, bilateral L2 and L3 transverse process fractures, the right L4 and the right L5 ransverse process fractures.  Non operative management.  Analgesia.
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
## 2024-11-16 NOTE — ASSESSMENT & PLAN NOTE
Nondisplaced right-sided T1 fracture at the junction of the pedicle and vertebral body.  CTA neck with no vertebral artery injury.   Non-operative management.  Cervical immobilization.  Benito Waters MD. Neurosurgeon. [facility].

## 2024-11-16 NOTE — ASSESSMENT & PLAN NOTE
Hemorrhagic intraparenchymal contusions within the left temporal lobe and left parietal lobe.   Countercoup intraparenchymal contusion within the right temporal lobe.  Repeat head CT stable.  SBP < 160 mmHg  Hypertonic protocol. Sodium goal 150-155  11/17 Right-sided frontal ventriculostomy drain placed.  Post traumatic pharmacologic seizure prophylaxis for 1 week.  Speech Language Pathology cognitive evaluation.  Benito Waters MD. Neurosurgeon. [facility].

## 2024-11-16 NOTE — CARE PLAN
Problem: Ventilation  Goal: Ability to achieve and maintain unassisted ventilation or tolerate decreased levels of ventilator support  Description: Target End Date:  4 days     Document on Vent flowsheet    1.  Support and monitor invasive and noninvasive mechanical ventilation  2.  Monitor ventilator weaning response  3.  Perform ventilator associated pneumonia prevention interventions  4.  Manage ventilation therapy by monitoring diagnostic test results  Outcome: Not Met     Ventilator Daily Summary    Vent Day # 2  Airway: 7.5@25    Ventilator settings: bts011+8@30%  Weaning trials: none  Respiratory Procedures: none    Plan: Continue current ventilator settings and wean mechanical ventilation as tolerated per physician orders.
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
## 2024-11-16 NOTE — ASSESSMENT & PLAN NOTE
VTE prophylaxis initially contraindicated secondary to elevated bleeding risk.  11/18 Trauma surveillance venous duplex ultrasonography ordered.

## 2024-11-16 NOTE — PROGRESS NOTES
[place] J cervical collar back with XS [place] J cervical collar front piece applied to patient with RN maintaining cervical spine immobilization. Collar appears to fit patient well at this time.    Contact traction with any questions or concerns regarding the use of this brace or further DME needs for this patient.
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
## 2024-11-16 NOTE — CARE PLAN
Problem: Safety - Medical Restraint  Goal: Free from restraint(s) (Restraint for Interference with Medical Device)  Flowsheets (Taken 11/16/2024 0441)  Addressed this shift: Free from restraint(s) (restraint for interference with medical device):   Every 24 hours: Continued use of restraint requires Licensed Independent Practitioner to perform face to face examination and written order   Identify and implement measures to help patient regain control   The patient is Unstable - High likelihood or risk of patient condition declining or worsening           Problem: Safety - Medical Restraint  Goal: Free from restraint(s) (Restraint for Interference with Medical Device)  Flowsheets (Taken 11/16/2024 0441)  Addressed this shift: Free from restraint(s) (restraint for interference with medical device):   Every 24 hours: Continued use of restraint requires Licensed Independent Practitioner to perform face to face examination and written order   Identify and implement measures to help patient regain control     Problem: Pain - Standard  Goal: Alleviation of pain or a reduction in pain to the patient’s comfort goal  Note: Pain  assessed q2 hours

## 2024-11-16 NOTE — ASSESSMENT & PLAN NOTE
Intubated at sending facility.  Continue full mechanical ventilatory support. Ventilator bundle and Trauma weaning protocol.

## 2024-11-16 NOTE — THERAPY
Speech Language Therapy Contact Note    Patient Name: Dejuan Aguilar  Age:  34 y.o., Sex:  male  Medical Record #: [id number]  Today's Date: 11/16/2024 11/16/24 0730   Treatment Variance   Reason For Missed Therapy Medical - Patient on Hold from Therapy   Interdisciplinary Plan of Care Collaboration   IDT Collaboration with    (EMR)   Collaboration Comments Cognitive evaluation orders received.  Patient currently on ventilator, unable to participate in evaluation.  Will follow for evaluation when medically appropriate and able to actively participate.

## 2024-11-16 NOTE — CARE PLAN
Problem: Ventilation  Goal: Ability to achieve and maintain unassisted ventilation or tolerate decreased levels of ventilator support  Description: Target End Date:  4 days     Document on Vent flowsheet    1.  Support and monitor invasive and noninvasive mechanical ventilation  2.  Monitor ventilator weaning response  3.  Perform ventilator associated pneumonia prevention interventions  4.  Manage ventilation therapy by monitoring diagnostic test results  Outcome: Progressing     Ventilator Daily Summary    Vent Day #2  Airway: 7.5 @25    Ventilator settings: 120/8/40  Weaning trials: no  Respiratory Procedures:no      Plan: Continue current ventilator settings and wean mechanical ventilation as tolerated per physician orders.
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
## 2024-11-16 NOTE — CARE PLAN
The patient is Watcher - Medium risk of patient condition declining or worsening    Shift Goals  Clinical Goals: neurologic and hemodynamic stability  Patient Goals: neo  Family Goals: neo    Progress made toward(s) clinical / shift goals:    Problem: Safety - Medical Restraint  Goal: Remains free of injury from restraints (Restraint for Interference with Medical Device)  Outcome: Progressing     Problem: Pain - Standard  Goal: Alleviation of pain or a reduction in pain to the patient’s comfort goal  Outcome: Progressing       Patient is not progressing towards the following goals:

## 2024-11-16 NOTE — FLOWSHEET NOTE
11/15/24 9689   Events/Summary/Plan   Events/Summary/Plan pt arrived via flight intubated from OSH, placed onto our vent   Ventiliation   $ Ventilation - Initial Yes   Vent Clock Initiated Yes   Ventilator Management Group   Trauma Group Yes   General Vent Information   Vent Mode (S)CMV   Vent Alarms   Ventilation Alarms Reviewed / Activated Yes   Upper Pressure Limit Alarm 45   Lower Pressure Limit 15   Low VE Alarm 4   High Respiratory Rate Alarm 40   Low Respiratory Rate Alarm 8   Low VT Alarm 150   Apnea Parameters Checked / Activated Yes   % Leak Alarm off   Vent Settings   FiO2% 100 %   Rate (breaths/min) 22   Vent Temp HME Yes   Vt Target (mL) 460   TI (Seconds) 0.8   PEEP/CPAP 8   Pramp 70   Trigger Type Flow Trigger   Sensitivity Setting 5   Vent Readings   PIP 25    Minute Volume 11.3   Control VTE (exp VT) 525   f Total (Breaths/Min) 22   I:E Ratio 1:2.4   MAP 13   PEEP/CPAP MONITORED 9.2   Static Compliance (ml / cm H2O) 43.2   R exp 0.59

## 2024-11-16 NOTE — PROGRESS NOTES
4 Eyes Skin Assessment Completed by NEW Mendenhall and NEW Cantu.    Head Swelling and Redness, abrasions    Ears WDL  Nose Redness, Blanching, and Scab  Mouth Redness and Bleeding  Neck Redness and Blanching  Breast/Chest Redness, Abrasion, and Scab    Shoulder Blades WDL  Spine WDL, roadrash on R side     (R) Arm/Elbow/Hand Redness, Abrasion, and Scab    (L) Arm/Elbow/Hand Redness, Abrasion, and Scab    Abdomen WDL  Groin WDL  Scrotum/Coccyx/Buttocks WDL  (R) Leg Abrasion    (L) Leg Abrasion  (R) Heel/Foot/Toe WDL  (L) Heel/Foot/Toe WDL          Devices In Places Blood Pressure Cuff, Pulse Ox, Walters, OG/NG, and Cervical Collar      Interventions In Place Pillows and Q2 Turns    Possible Skin Injury No    Pictures Uploaded Into Epic Yes  Wound Consult Placed N/A  RN Wound Prevention Protocol Ordered No

## 2024-11-16 NOTE — ASSESSMENT & PLAN NOTE
Left orbital floor fracture, medial wall fracture of the left orbit, nasal bone fracture, left zygoma fracture.  Non-operative management.  Benito Reyes DDS, MD.  Oral and maxillofacial surgeon. [facility].

## 2024-11-16 NOTE — ASSESSMENT & PLAN NOTE
Sternal fracture.  Aggressive multimodal pain management, and pulmonary hygiene. Serial chest radiographs.
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
## 2024-11-17 NOTE — CARE PLAN
Problem: Ventilation  Goal: Ability to achieve and maintain unassisted ventilation or tolerate decreased levels of ventilator support  Description: Target End Date:  4 days     Document on Vent flowsheet    1.  Support and monitor invasive and noninvasive mechanical ventilation  2.  Monitor ventilator weaning response  3.  Perform ventilator associated pneumonia prevention interventions  4.  Manage ventilation therapy by monitoring diagnostic test results  Outcome: Progressing    Vent Day # 3     Ventilator settings changed this shift: None     Weaning trials: Failed SAT    Respiratory Procedures: None     Plan: Continue current ventilator settings and wean mechanical ventilation as tolerated per physician orders.
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
## 2024-11-17 NOTE — CARE PLAN
The patient is Watcher - Medium risk of patient condition declining or worsening    Shift Goals  Clinical Goals: Q1 hr neuro checks, monitor labs, monitor HR, monitor BP SBP goal <160  Patient Goals: NAT  Family Goals: NAT    Progress made toward(s) clinical / shift goals:    Problem: Knowledge Deficit - Standard  Goal: Patient and family/care givers will demonstrate understanding of plan of care, disease process/condition, diagnostic tests and medications  Description: Target End Date:  1-3 days or as soon as patient condition allows    Document in Patient Education    1.  Patient and family/caregiver oriented to unit, equipment, visitation policy and means for communicating concern  2.  Complete/review Learning Assessment  3.  Assess knowledge level of disease process/condition, treatment plan, diagnostic tests and medications  4.  Explain disease process/condition, treatment plan, diagnostic tests and medications  Outcome: Progressing     Problem: Safety - Medical Restraint  Goal: Remains free of injury from restraints (Restraint for Interference with Medical Device)  Description: INTERVENTIONS:  1. Determine that other, less restrictive measures have been tried or would not be effective before applying the restraint  2. Evaluate the patient's condition at the time of restraint application  3. Inform patient/family regarding the reason for restraint  4. Q2H: Monitor safety, nutrition and hydration  Outcome: Progressing  Goal: Free from restraint(s) (Restraint for Interference with Medical Device)  Description: INTERVENTIONS:  1.  ONCE/SHIFT or MINIMUM Q12H: Assess and document the continuing need for restraints  2.  Q24H: Continued use of restraint requires LIP to perform face to face examination and written order  3.  Identify and implement measures to help patient regain control  4.  Educate patient/family on discontinuation criteria   5.  Assess patient's understanding and retention of education provided  6.   Assess readiness for release & initiate progressive release per protocol  7.  Identify and document criteria for restraints  Outcome: Progressing     Problem: Pain - Standard  Goal: Alleviation of pain or a reduction in pain to the patient’s comfort goal  Description: Target End Date:  Prior to discharge or change in level of care    Document on Vitals flowsheet    1.  Document pain using the appropriate pain scale per order or unit policy  2.  Educate and implement non-pharmacologic comfort measures (i.e. relaxation, distraction, massage, cold/heat therapy, etc.)  3.  Pain management medications as ordered  4.  Reassess pain after pain med administration per policy  5.  If opiods administered assess patient's response to pain medication is appropriate per POSS sedation scale  6.  Follow pain management plan developed in collaboration with patient and interdisciplinary team (including palliative care or pain specialists if applicable)  Outcome: Progressing       Patient is not progressing towards the following goals:

## 2024-11-17 NOTE — CARE PLAN
Problem: Ventilation  Goal: Ability to achieve and maintain unassisted ventilation or tolerate decreased levels of ventilator support  Description: Target End Date:  4 days     Document on Vent flowsheet    1.  Support and monitor invasive and noninvasive mechanical ventilation  2.  Monitor ventilator weaning response  3.  Perform ventilator associated pneumonia prevention interventions  4.  Manage ventilation therapy by monitoring diagnostic test results  Outcome: Not Met                        Ventilator Daily Summary     Vent Day # 3  Airway: 7.5@25     Ventilator settings: anc321+8@40%  Weaning trials: none  Respiratory Procedures: none     Plan: Continue current ventilator settings and wean mechanical ventilation as tolerated per physician orders.
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
## 2024-11-17 NOTE — CARE PLAN
Problem: Patient Care Overview  Goal: Plan of Care/Patient Progress Review  Outcome: No Change  Pt stable through PACU recovery.  PACU nurse present until 1250 as pt came out of general anesthesia.  Pt endorses pain with fundal exam but otherwise resting comfortably, declining any other pain medication at this time.  Tolerated apple juice.  VSS, ff@u-2, scant-small lochia.  Pt pumped with assistance.  Left PACU at 1402, now visiting with baby in NICU and will then move to 7143.       The patient is Watcher - Medium risk of patient condition declining or worsening    Shift Goals  Clinical Goals: Q1 neuro chcks, improved neuro sttus, hemodynamic stability  Patient Goals: NAT  Family Goals: Not present    Progress made toward(s) clinical / shift goals:    Neurological status not improving at this time, EVD placed at bedside by Dr. Waters. Patient less agitated off of sedation at this time with use of PRN medications, attempting to wean fent gptt.   Problem: Knowledge Deficit - Standard  Goal: Patient and family/care givers will demonstrate understanding of plan of care, disease process/condition, diagnostic tests and medications  Description: Target End Date:  1-3 days or as soon as patient condition allows    Document in Patient Education    1.  Patient and family/caregiver oriented to unit, equipment, visitation policy and means for communicating concern  2.  Complete/review Learning Assessment  3.  Assess knowledge level of disease process/condition, treatment plan, diagnostic tests and medications  4.  Explain disease process/condition, treatment plan, diagnostic tests and medications  Outcome: Progressing     Problem: Safety - Medical Restraint  Goal: Remains free of injury from restraints (Restraint for Interference with Medical Device)  Description: INTERVENTIONS:  1. Determine that other, less restrictive measures have been tried or would not be effective before applying the restraint  2. Evaluate the patient's condition at the time of restraint application  3. Inform patient/family regarding the reason for restraint  4. Q2H: Monitor safety, nutrition and hydration  Outcome: Progressing  Goal: Free from restraint(s) (Restraint for Interference with Medical Device)  Description: INTERVENTIONS:  1.  ONCE/SHIFT or MINIMUM Q12H: Assess and document the continuing need for restraints  2.  Q24H: Continued use of restraint requires LIP to perform face to face examination and written order  3.   Identify and implement measures to help patient regain control  4.  Educate patient/family on discontinuation criteria   5.  Assess patient's understanding and retention of education provided  6.  Assess readiness for release & initiate progressive release per protocol  7.  Identify and document criteria for restraints  Outcome: Progressing     Problem: Pain - Standard  Goal: Alleviation of pain or a reduction in pain to the patient’s comfort goal  Description: Target End Date:  Prior to discharge or change in level of care    Document on Vitals flowsheet    1.  Document pain using the appropriate pain scale per order or unit policy  2.  Educate and implement non-pharmacologic comfort measures (i.e. relaxation, distraction, massage, cold/heat therapy, etc.)  3.  Pain management medications as ordered  4.  Reassess pain after pain med administration per policy  5.  If opiods administered assess patient's response to pain medication is appropriate per POSS sedation scale  6.  Follow pain management plan developed in collaboration with patient and interdisciplinary team (including palliative care or pain specialists if applicable)  Outcome: Progressing     Problem: Skin Integrity  Goal: Skin integrity is maintained or improved  Description: Target End Date:  Prior to discharge or change in level of care    Document interventions on Skin Risk/[place] flowsheet groups and corresponding LDA    1.  Assess and monitor skin integrity, appearance and/or temperature  2.  Assess risk factors for impaired skin integrity and/or pressures ulcers  3.  Implement precautions to protect skin integrity in collaboration with interdisciplinary team  4.  Implement pressure ulcer prevention protocol if at risk for skin breakdown  5.  Confirm wound care consult if at risk for skin breakdown  6.  Ensure patient use of pressure relieving devices  (Low air loss bed, waffle overlay, heel protectors, ROHO cushion, etc)  Outcome: Progressing      Problem: Fall Risk  Goal: Patient will remain free from falls  Description: Target End Date:  Prior to discharge or change in level of care    Document interventions on the Yessenia Sandoval Fall Risk Assessment    1.  Assess for fall risk factors  2.  Implement fall precautions  Outcome: Progressing       Patient is not progressing towards the following goals:   N/A

## 2024-11-17 NOTE — OP REPORT
DATE OF SERVICE:  11/17/2024     PREOPERATIVE DIAGNOSIS:  Severe closed head injury with poor GCS.     POSTOPERATIVE DIAGNOSIS:  Severe closed head injury with poor GCS.     PROCEDURE:  Right-sided frontal ventriculostomy drain placement.     SURGEON:  Benito Waters MD     ASSISTANT:  None.     ANESTHESIA:  Bedside propofol, fentanyl and Versed.     COMPLICATIONS:  None.     ESTIMATED BLOOD LOSS:  Minimal.     DESCRIPTION OF PROCEDURE:  Surgical clippers were used to clip the patient's   hair.  He was then prepped in the usual sterile fashion.  An 18-gauge needle   was then used to radha Kocher's point.  He was then prepped and draped in the   usual sterile fashion.  A 15 blade was used to incise the skin.  Dissection   was carried down to bone using a 15 blade.  We then drilled a craniostomy hole   in front of the trocar was used to puncture the dura.  We then inserted the   ventriculostomy catheter approximately 7.5 cm to the skin and had a good pop   and good flow of CSF.  Opening pressure was approximately 25 cm of water.    Catheter was then tunneled and incisions were closed and catheter was secured   using 3-0 nylon.  There were no complications.  I was present and scrubbed for   the entire procedure.        ______________________________  Benito Waters MD    CPD/AZM    DD:  11/17/2024 11:41  DT:  11/17/2024 11:48    Job#:  [id number]

## 2024-11-17 NOTE — PROCEDURES
2043 MD Baltazar at the bedside preparing for central line placement  2047 Time out completed, central line placement R subclavian. Hemodynamic instability, blood draws/ vasoactive medications. 16 cm catheter   2051 sterile procedure, patient in tren[place]mberg psotion  2052 2 mg versed ivp  2054 line in by MD baltazar  2055 100mcg fentanyl ivp    2056 While procedure being preformed patient became increasing agitated, verbal orders to increase the propofol 80 mcg/kg/min  by Shannan MERCHANT and MD baltazar.   2057 procedure completed, wire removed and intact. CXR ordered and called.  2103 CXR being completed at bedside.  2104 : CXR  completed. Dr. Baltazar at bedside to view CXR.  Per Dr. Baltazar central line ok to use.

## 2024-11-17 NOTE — PROGRESS NOTES
MAYUR Ornelas notified of HGB drop from 11.5 to 8.3 this AM. Patient tachycardic overnight -130, BP stable -160.
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
## 2024-11-17 NOTE — PROGRESS NOTES
Dr. Waters at bedside for EVD placement to right side.     Start time 1128  1125 100 mcg Fent given  1127 5 mg Versed given    End time 1139    Patient tolerated bedside procedure. Transducer zeroed, appropriate waveform.
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
## 2024-11-18 NOTE — PROGRESS NOTES
Dr. Nguyen and MAYUR Ornelas notified of new right eye twitch with sedation off. Patient presenting much calmer off sedation than prior to EVD placement. Continue to pause sedation at this time as patient tolerates.

## 2024-11-18 NOTE — DISCHARGE PLANNING
DARYL is Pt's Brother-Woody [phone]    Hx obtained from Pt's Uncle whom he lives with in [place]. Pt is undocumented and has only been in the [place] for six  Months. 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 He has no SSN, no insurance. His girlfriend and parents are in [place]. He is independent with all ADL/IADLs and has no Hx of MH Dx, alcohol or substance abuse.     Plan after discharge is for Pt to return to [place] with his brother.     Care Transition Team Assessment    Information Source  Orientation Level: Unable to assess  Information Given By: Relative  Informant's Name: Uncle (Woody)  Who is responsible for making decisions for patient? : Patient    Readmission Evaluation  Is this a readmission?: No    Elopement Risk  Legal Hold: No  Ambulatory or Self Mobile in Wheelchair: No-Not an Elopement Risk  Elopement Risk: Not at Risk for Elopement     Discharge Preparedness  What are your discharge supports?: Other (comment) (Uncle)  Prior Functional Level: Ambulatory, Drives Self, Independent with Activities of Daily Living, Independent with Medication Management  Difficulity with ADLs: None  Difficulity with IADLs: None    Functional Assesment  Prior Functional Level: Ambulatory, Drives Self, Independent with Activities of Daily Living, Independent with Medication Management    Finances  Financial Barriers to Discharge: Yes  Average Monthly Income: 0 $  Source of Income: None  Prescription Coverage: No    Vision / Hearing Impairment  Right Eye Vision: Impaired (bruising and swelling)  Left Eye Vision: Impaired (bruising and swelling)      Advance Directive  Advance Directive?: Clinically incapacitated / Inappropriate       Psychological Assessment  History of Substance Abuse: None  History of Psychiatric Problems: No  Non-compliant with Treatment: No  Newly Diagnosed Illness: Yes    Discharge Risks or Barriers  Discharge risks or barriers?: No PCP, Uninsured / underinsured, Complex medical  needs  Patient risk factors: No PCP, Uninsured or underinsured    Anticipated Discharge Information  Discharge Disposition: D/T to Medicaid only Nursing home (63)  Discharge Contact Phone Number: Woody ()

## 2024-11-18 NOTE — CARE PLAN
Problem: Safety - Medical Restraint  Goal: Free from restraint(s) (Restraint for Interference with Medical Device)  Flowsheets (Taken 11/18/2024 5365)  Addressed this shift: Free from restraint(s) (restraint for interference with medical device):   ONCE/SHIFT or MINIMUM Every 12 hours: Assess and document the continuing need for restraints   Every 24 hours: Continued use of restraint requires Licensed Independent Practitioner to perform face to face examination and written order   Identify and implement measures to help patient regain control     Problem: Pain - Standard  Goal: Alleviation of pain or a reduction in pain to the patient’s comfort goal  Note: Pain assessed q2 hours   The patient is Unstable - High likelihood or risk of patient condition declining or worsening    Shift Goals  Clinical Goals: Q1 neuro chcks, improved neuro sttus, hemodynamic stability  Patient Goals: NAT  Family Goals: Not present

## 2024-11-18 NOTE — PROGRESS NOTES
Patient no longer tolerating sedation pause. HR: ST 160s, SBP 180s. Fent 100 mcg given per MAYUR Ornelas at bedside.

## 2024-11-18 NOTE — CARE PLAN
Problem: Ventilation  Goal: Ability to achieve and maintain unassisted ventilation or tolerate decreased levels of ventilator support  Description: Target End Date:  4 days     Document on Vent flowsheet    1.  Support and monitor invasive and noninvasive mechanical ventilation  2.  Monitor ventilator weaning response  3.  Perform ventilator associated pneumonia prevention interventions  4.  Manage ventilation therapy by monitoring diagnostic test results  Outcome: Not Met   Vent Day 4   7.5 @ 25  ASV: 100/10/60%  No Spont

## 2024-11-18 NOTE — PROGRESS NOTES
Neurosurgery Progress Note    Subjective:  Seen in conjunction w/ Dr Waters  Propofol stopped x 10min for exam, does not tolerate well  ICPs 11-13, 5-7 out per hour    Exam:  Attempts to open eyes  Perrl, conjugate  Minimal mvmt extremities   Not f/c  EVD at zero cm      BP  Min: 120/57  Max: 168/68  Pulse  Av.2  Min: 109  Max: 150  Resp  Av  Min: 14  Max: 48  Monitored Temp 2  Av.7 °C (101.7 °F)  Min: 36.8 °C (98.2 °F)  Max: 39.3 °C (102.7 °F)  SpO2  Av.3 %  Min: 91 %  Max: 98 %    ICP  Av.4 MM HG  Min: -1 MM HG  Max: 26 MM HG  CPP   Av.8  Min: 66  Max: 91    Recent Labs     24  0555 24  0559 24  0832 24  0728   WBC 15.2* 11.4*  --  11.4*   RBC 4.31* 3.07*  --  2.98*   HEMOGLOBIN 11.5* 8.3* 11.2* 8.1*   HEMATOCRIT 34.3* 25.5*  --  24.5*   MCV 79.6* 83.1  --  82.2   MCH 26.7* 27.0  --  27.2   MCHC 33.5 32.5  --  33.1   RDW 39.1 44.6  --  44.7   PLATELETCT 184 126*  --  151*   MPV 9.2 10.0  --  10.5     Recent Labs     24  1758 24  2355 24  0728   SODIUM 147* 154* 151*   POTASSIUM 3.1* 2.3* 2.1*   CHLORIDE 120* 126* 122*   CO2 19* 21 20   GLUCOSE 152* 180* 188*   BUN 7* 10 14   CREATININE 0.43* 0.49* 0.54   CALCIUM 7.7* 8.3* 8.6     Recent Labs     11/15/24  2308   APTT 26.9   INR 1.21*     Recent Labs     11/15/24  230   REACTMIN 4.7   CLOTKINET 1.3   CLOTANGL 72.9   MAXCLOTS 62.8   [id number] 0.0   PRCINADP 100.0*   PRCINAA 8.4       Intake/Output                         24 - 24 0659 24 - 2459      Total  Total                 Intake    I.V.  1993.4  1025.1 3018.5  65.8  -- 65.8    Fentanyl Volume 21.2 26.4 47.7 2.2 -- 2.2    Propofol Volume 202.9 331.9 534.8 13.5 -- 13.5    Volume (mL) (NS infusion) 932.6 -- 932.6 -- -- --    Volume (mL) (3% sodium chloride (Hypertonic Saline) 500mL infusion) 836.7 666.8 1503.5 50.1 -- 50.1    IV Piggyback  --  150 150  --  -- --     Volume (mL) (sodium chloride 200 mEq in empty bag 50 mL infusion) -- 50 50 -- -- --    Volume (mL) (potassium chloride in water (Kcl) ivpb **Administer in ICU only** 20 mEq) -- 100 100 -- -- --    Enteral  390  652.5 1042.5  --  -- --    Free Water / Tube Flush 90 -- 90 -- -- --    Enteral Volume (Enteral Tube 11/16/24 Orogastric) 300 652.5 952.5 -- -- --    Total Intake 2383.4 1827.6 4211 65.8 -- 65.8       Output    Urine  2050 1750 3800  --  -- --    Output (mL) (Urethral Catheter Coude 12 Fr.) 2050 1750 3800 -- -- --    Drains  38  114 152  10  -- 10    Output (mL) (ICP/Ventriculostomy Right) 38 114 152 10 -- 10    Chest Tube  54  60 114  --  -- --    Output (mL) (Chest Tube 1 Right Fourth intercostal space) 54 60 114 -- -- --    Total Output 2142 1924 4066 10 -- 10       Net I/O     241.4 -96.4 145 55.8 -- 55.8              Intake/Output Summary (Last 24 hours) at 11/18/2024 0912  Last data filed at 11/18/2024 0718  Gross per 24 hour   Intake 3414.51 ml   Output 3526 ml   Net -111.49 ml             sodium chloride  2 g Q8HRS    QUEtiapine  100 mg Q8HRS    Pharmacy Consult Request  1 Each PHARMACY TO DOSE    propranolol  10 mg Q8HRS    fentaNYL   Continuous    fentaNYL  100 mcg Q HOUR PRN    3% sodium chloride  0-60 mL/hr Continuous    sodium chloride 200 mEq in empty bag 50 mL infusion  200 mEq Q6HRS PRN    gabapentin  100 mg TID    acetaminophen  1,000 mg Q8HRS    metaxalone  800 mg TID    labetalol  10-20 mg Q4HRS PRN    hydrALAZINE  10-20 mg Q4HRS PRN    insulin lispro  1-6 Units Q6HRS    And    dextrose bolus  25 g Q15 MIN PRN    Pharmacy  1 Each PHARMACY TO DOSE    oxyCODONE immediate-release  5 mg Q3HRS PRN    Or    oxyCODONE immediate-release  10 mg Q3HRS PRN    acetaminophen  650 mg Q4HRS PRN    Or    acetaminophen  650 mg Q4HRS PRN    Respiratory Therapy Consult   Continuous RT    ondansetron  4 mg Q4HRS PRN    senna-docusate  1 Tablet Nightly    senna-docusate  1 Tablet Q24HRS PRN    polyethylene  glycol/lytes  1 Packet BID    magnesium hydroxide  30 mL DAILY AT 1800    bisacodyl  10 mg Q24HRS PRN    sodium phosphate  1 Each Once PRN    propofol  0-80 mcg/kg/min (Ideal) Continuous    famotidine  20 mg BID    Or    famotidine  20 mg BID    levETIRAcetam  500 mg Q12HRS    Or    levETIRAcetam (Keppra) IV  500 mg Q12HRS    ondansetron  4 mg Q4HRS PRN    docusate sodium  100 mg BID       Assessment and Plan:  Hospital day #2 severe chi, T1, T5, T12 fx  POD# na  Chemical prophylactic DVT therapy: No  Start date/time: tbd     Brain Compression: Yes Traumatic    Neuro as above, diff to examine d/t agitation w/ prop off  Keppra x7d  Target [id number], today 151 on 3% protocol  Sbp<160  Cont EVD at zero cm, Q 1 hr ICP  Collar for T1 fx

## 2024-11-18 NOTE — CARE PLAN
Problem: Ventilation  Goal: Ability to achieve and maintain unassisted ventilation or tolerate decreased levels of ventilator support  Description: Target End Date:  4 days     Document on Vent flowsheet    1.  Support and monitor invasive and noninvasive mechanical ventilation  2.  Monitor ventilator weaning response  3.  Perform ventilator associated pneumonia prevention interventions  4.  Manage ventilation therapy by monitoring diagnostic test results  Outcome: Not Met     Ventilator Daily Summary    Vent Day # 4  Airway: 7.5@25    Ventilator settings: [id number]+8@50%  Weaning trials: none  Respiratory Procedures: none    Plan: Continue current ventilator settings and wean mechanical ventilation as tolerated per physician orders.

## 2024-11-18 NOTE — DISCHARGE PLANNING
NOK is Pt's brother Woody [phone]     Hx obtained from Pt's brother, Woody. Pt is undocumented and has no SSN. 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 He has a girlfriend, a child, and parents all who live in [place]. Pt was independent with all ADL/IADLs and has no Hx of MH Dx or SA.    There is no insurance coverage. Woody, the Pt's brother says the plan after discharge will be to return to [place] with him.  Care Transition Team Assessment    Information Source  Orientation Level: Unable to assess  Information Given By: Relative  Informant's Name: Uncle (Woody)  Who is responsible for making decisions for patient? : Patient    Readmission Evaluation  Is this a readmission?: No    Elopement Risk  Legal Hold: No  Ambulatory or Self Mobile in Wheelchair: No-Not an Elopement Risk  Elopement Risk: Not at Risk for Elopement         Discharge Preparedness  What are your discharge supports?: Other (comment) (Uncle)  Prior Functional Level: Ambulatory, Drives Self, Independent with Activities of Daily Living, Independent with Medication Management  Difficulity with ADLs: None  Difficulity with IADLs: None    Functional Assesment  Prior Functional Level: Ambulatory, Drives Self, Independent with Activities of Daily Living, Independent with Medication Management    Finances  Financial Barriers to Discharge: Yes  Average Monthly Income: 0 $  Source of Income: None  Prescription Coverage: No    Vision / Hearing Impairment  Right Eye Vision: Impaired (bruising and swelling)  Left Eye Vision: Impaired (bruising and swelling)         Advance Directive  Advance Directive?: Clinically incapacitated / Inappropriate         Psychological Assessment  History of Substance Abuse: None  History of Psychiatric Problems: No  Non-compliant with Treatment: No  Newly Diagnosed Illness: Yes    Discharge Risks or Barriers  Discharge risks or barriers?: No PCP, Uninsured / underinsured,  Complex medical needs  Patient risk factors: No PCP, Uninsured or underinsured    Anticipated Discharge Information  Discharge Disposition: D/T to Medicaid only Nursing home (63)  Discharge Contact Phone Number: Woody ()

## 2024-11-19 NOTE — DISCHARGE PLANNING
[facility] Transitional Care Coordination    Physiatry consult pended.  Intubated/sedated - vent day 5.   Please reach out sooner if PMR consult requested for medical management.

## 2024-11-19 NOTE — PROGRESS NOTES
Facial Trauma Consult  [facility] Oral & Maxillofacial Surgery     ID: Dejuan Aguilar is a 34 y.o. male who presented to the ER for multiple injuries      PMH:   Past Medical History   No past medical history on file.     Medications:   Current Medications             Current Facility-Administered Medications   Medication Dose Route Frequency Provider Last Rate Last Admin    fentaNYL (SUBLIMAZE) 50 mcg/mL in 50mL (Continuous Infusion)   Intravenous Continuous Venkat Baltazar M.D. 2 mL/hr at 11/16/24 0747 100 mcg/hr at 11/16/24 0747    fentaNYL (Sublimaze) injection 50 mcg  50 mcg Intravenous Q HOUR PRN RAD Infante   50 mcg at 11/16/24 1240    3% sodium chloride (Hypertonic Saline) 500mL infusion  0-60 mL/hr Intravenous Continuous Benito Waters M.D. 50 mL/hr at 11/16/24 1352 50 mL/hr at 11/16/24 1352    sodium chloride 200 mEq in empty bag 50 mL infusion  200 mEq Intravenous Q6HRS PRN Benito Waters M.D.        QUEtiapine (SEROquel) tablet 100 mg  100 mg Enteral Tube TID Jovanny Nguyen M.D.   100 mg at 11/16/24 1240    gabapentin (Neurontin) capsule 100 mg  100 mg Enteral Tube TID Jovanny Nguyen M.D.   100 mg at 11/16/24 1240    acetaminophen (Tylenol) tablet 1,000 mg  1,000 mg Enteral Tube Q8HRS Jovanny Nguyen M.D.   1,000 mg at 11/16/24 1240    metaxalone (Skelaxin) tablet 800 mg  800 mg Enteral Tube TID Jovanny Nguyen M.D.   800 mg at 11/16/24 1240    labetalol (Normodyne/Trandate) injection 10-20 mg  10-20 mg Intravenous Q4HRS PRN Jovanny Nguyen M.D.        hydrALAZINE (Apresoline) injection 10-20 mg  10-20 mg Intravenous Q4HRS PRN Jovanny Nguyen M.D.        insulin lispro (HumaLOG,AdmeLOG) subcutaneous injection  1-6 Units Subcutaneous Q6HRS Jovanny Nguyen M.D.         And    dextrose 50% (D50W) injection 25 g  25 g Intravenous Q15 MIN PRN Jovanny Nguyen M.D.        Pharmacy Consult: Enteral tube insertion - review meds/change route/product selection  1 Each  Other PHARMACY TO DOSE Ceci Pappas D.N.P.        oxyCODONE immediate-release (Roxicodone) tablet 5 mg  5 mg Enteral Tube Q3HRS PRN Ceci Pappas D.N.P.         Or    oxyCODONE immediate release (Roxicodone) tablet 10 mg  10 mg Enteral Tube Q3HRS PRN Ceci Pappas D.N.P.   10 mg at 11/16/24 1357    midazolam (Versed) injection 1 mg  1 mg Intravenous Once Ceci Pappas D.N.P.        Respiratory Therapy Consult   Nebulization Continuous RT RAD Infante        Pharmacy Consult Request ...Pain Management Review 1 Each  1 Each Other PHARMACY TO DOSE RAD Infante        ondansetron (Zofran) syringe/vial injection 4 mg  4 mg Intravenous Q4HRS PRN RAD Infante        senna-docusate (Pericolace Or Senokot S) 8.6-50 MG per tablet 1 Tablet  1 Tablet Enteral Tube Nightly RAD Infante        senna-docusate (Pericolace Or Senokot S) 8.6-50 MG per tablet 1 Tablet  1 Tablet Enteral Tube Q24HRS PRN RAD Infante        polyethylene glycol/lytes (Miralax) Packet 1 Packet  1 Packet Enteral Tube BID RAD Infante        magnesium hydroxide (Milk Of Magnesia) suspension 30 mL  30 mL Enteral Tube DAILY AT 1800 RAD Infante        bisacodyl (Dulcolax) suppository 10 mg  10 mg Rectal Q24HRS PRN RAD Infante        sodium phosphate enema 1 Each  1 Each Rectal Once PRN RAD Infante        propofol (DIPRIVAN) injection  0-80 mcg/kg/min (Ideal) Intravenous Continuous RAD Infante 30.6 mL/hr at 11/16/24 1358 80 mcg/kg/min at 11/16/24 1358    famotidine (Pepcid) tablet 20 mg  20 mg Enteral Tube BID ALIA InfanteP.R.N.         Or    famotidine (Pepcid) injection 20 mg  20 mg Intravenous BID MARIBEL Infante.P.R.N.   20 mg at 11/16/24 0553    NS infusion   Intravenous Continuous ALIA InfanteP.R.N. 125 mL/hr at 11/16/24 0907 New Bag at 11/16/24 0907    levETIRAcetam (Keppra) tablet 500 mg  500 mg  Enteral Tube Q12HRS TIERNEY InfanteRJERMAIN         Or    levETIRAcetam (Keppra) injection 500 mg  500 mg Intravenous Q12HRS GLENN Infante.   500 mg at 11/16/24 0552    Pharmacy Consult: Enteral tube insertion - review meds/change route/product selection   Other PHARMACY TO DOSE RAD Infante        ondansetron (Zofran ODT) dispertab 4 mg  4 mg Enteral Tube Q4HRS PRN TIERNEY InfanteRJERMAIN        docusate sodium (Colace) oral solution 100 mg  100 mg Enteral Tube BID RAD Infante             Allergies:   Allergies   Not on File     Surgical history:   Past Surgical History   No past surgical history on file.     Social history:   Social History          Social History Narrative    Not on file      Family history:   Family History   No family history on file.        ROS: All systems reviewed and are negative other than those noted in HPI and PMH.     Vitals:      Vitals:     11/16/24 1453   BP:     Pulse: (!) 141   Resp: (!) 23   Temp:     SpO2: 93%         Labs:       Recent Labs     11/15/24  2308 11/16/24  0555   WBC 28.9* 15.2*   RBC 5.55 4.31*   HEMOGLOBIN 15.3 11.5*   HEMATOCRIT 44.3 34.3*   MCV 79.8* 79.6*   MCH 27.6 26.7*   RDW 38.8 39.1   PLATELETCT 274 184   MPV 9.2 9.2   NEUTSPOLYS  --  77.20*   LYMPHOCYTES  --  13.30*   MONOCYTES  --  8.30   EOSINOPHILS  --  0.10   BASOPHILS  --  0.10            Recent Labs     11/16/24  0257 11/16/24  0555 11/16/24  1148   SODIUM 140 142 143   POTASSIUM 4.8 4.5 4.3   CHLORIDE 112 114* 115*   CO2 17* 18* 19*   GLUCOSE 171* 176* 144*   BUN 19 18 16            Imaging: Independent review of CT maxillofacial with the following findings: left orbital floor fracture      Assessment: 34 y.o. male with left orbital floor fracture      Plan:  Likely non-op fracture   No urgent/emergent surgery needed  Can follow up as needed for outpatient omer Reyes DDS, MD  [facility] Maxill[facility]

## 2024-11-19 NOTE — DIETARY
Nutrition Services Brief Update:    Tube feed with Peptamen Intense VHP at goal rate of 65 ml/hour while on propofol. Propofol at 80 mcg/kg/min providing 808 kcal in 24 hours.    Problem: Nutritional:  Goal: Nutrition support tolerated and meeting greater than 85% of estimated needs  Outcome: Goal met    RD following
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
## 2024-11-19 NOTE — CARE PLAN
Problem: Ventilation  Goal: Ability to achieve and maintain unassisted ventilation or tolerate decreased levels of ventilator support  Description: Target End Date:  4 days     Document on Vent flowsheet    1.  Support and monitor invasive and noninvasive mechanical ventilation  2.  Monitor ventilator weaning response  3.  Perform ventilator associated pneumonia prevention interventions  4.  Manage ventilation therapy by monitoring diagnostic test results  Outcome: Not Met     Ventilator Daily Summary    Vent Day #5  Airway: 7.5@25    Ventilator settings: [id number]@60%  Weaning trials: none  Respiratory Procedures: none    Plan: Continue current ventilator settings and wean mechanical ventilation as tolerated per physician orders.

## 2024-11-19 NOTE — CARE PLAN
The patient is Unstable - High likelihood or risk of patient condition declining or worsening    Shift Goals  Clinical Goals: Q1hr Neuro checks, Hemodynamic stability.  Patient Goals: NAT  Family Goals: NAT    Progress made toward(s) clinical / shift goals:      Problem: Knowledge Deficit - Standard  Goal: Patient and family/care givers will demonstrate understanding of plan of care, disease process/condition, diagnostic tests and medications  Outcome: Progressing     Problem: Safety - Medical Restraint  Goal: Remains free of injury from restraints (Restraint for Interference with Medical Device)  Outcome: Progressing     Problem: Pain - Standard  Goal: Alleviation of pain or a reduction in pain to the patient’s comfort goal  Outcome: Progressing     Problem: Skin Integrity  Goal: Skin integrity is maintained or improved  Outcome: Progressing     Problem: Fall Risk  Goal: Patient will remain free from falls  Outcome: Progressing       Patient is not progressing towards the following goals:    Problem: Safety - Medical Restraint  Goal: Free from restraint(s) (Restraint for Interference with Medical Device)  Outcome: Not Progressing
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
## 2024-11-19 NOTE — CARE PLAN
Problem: Ventilation  Goal: Ability to achieve and maintain unassisted ventilation or tolerate decreased levels of ventilator support  Description: Target End Date:  4 days     Document on Vent flowsheet    1.  Support and monitor invasive and noninvasive mechanical ventilation  2.  Monitor ventilator weaning response  3.  Perform ventilator associated pneumonia prevention interventions  4.  Manage ventilation therapy by monitoring diagnostic test results  Outcome: Progressing     Ventilator Daily Summary    Vent Day #5  Airway: 7.5 @25    Ventilator settings:100/10/60   Weaning trials: no  Respiratory Procedures: no     Plan: Continue current ventilator settings and wean mechanical ventilation as tolerated per physician orders.

## 2024-11-19 NOTE — CARE PLAN
The patient is Unstable - High likelihood or risk of patient condition declining or worsening    Shift Goals  Clinical Goals: Q1 neuro  Patient Goals: NAT  Family Goals: NAT    Progress made toward(s) clinical / shift goals:  updated family on POC, all questions, medicated per MAR.        Problem: Safety - Medical Restraint  Goal: Remains free of injury from restraints (Restraint for Interference with Medical Device)  Outcome: Progressing       Problem: Skin Integrity  Goal: Skin integrity is maintained or improved  Outcome: Progressing       Patient is not progressing towards the following goals:      Problem: Knowledge Deficit - Standard  Goal: Patient and family/care givers will demonstrate understanding of plan of care, disease process/condition, diagnostic tests and medications  Outcome: Not Progressing     Problem: Pain - Standard  Goal: Alleviation of pain or a reduction in pain to the patient’s comfort goal  Outcome: Not Progressing

## 2024-11-19 NOTE — CARE PLAN
The patient is Unstable - High likelihood or risk of patient condition declining or worsening    Shift Goals  Clinical Goals: q1h neuro improved or unchanged,  Patient Goals: neo  Family Goals: neo    Progress made toward(s) clinical / shift goals:    Problem: Knowledge Deficit - Standard  Goal: Patient and family/care givers will demonstrate understanding of plan of care, disease process/condition, diagnostic tests and medications  Description: Target End Date:  1-3 days or as soon as patient condition allows    Document in Patient Education    1.  Patient and family/caregiver oriented to unit, equipment, visitation policy and means for communicating concern  2.  Complete/review Learning Assessment  3.  Assess knowledge level of disease process/condition, treatment plan, diagnostic tests and medications  4.  Explain disease process/condition, treatment plan, diagnostic tests and medications  Outcome: Progressing     Problem: Safety - Medical Restraint  Goal: Remains free of injury from restraints (Restraint for Interference with Medical Device)  Description: INTERVENTIONS:  1. Determine that other, less restrictive measures have been tried or would not be effective before applying the restraint  2. Evaluate the patient's condition at the time of restraint application  3. Inform patient/family regarding the reason for restraint  4. Q2H: Monitor safety, nutrition and hydration  Outcome: Progressing  Goal: Free from restraint(s) (Restraint for Interference with Medical Device)  Description: INTERVENTIONS:  1.  ONCE/SHIFT or MINIMUM Q12H: Assess and document the continuing need for restraints  2.  Q24H: Continued use of restraint requires LIP to perform face to face examination and written order  3.  Identify and implement measures to help patient regain control  4.  Educate patient/family on discontinuation criteria   5.  Assess patient's understanding and retention of education provided  6.  Assess readiness for  release & initiate progressive release per protocol  7.  Identify and document criteria for restraints  Outcome: Progressing     Problem: Pain - Standard  Goal: Alleviation of pain or a reduction in pain to the patient’s comfort goal  Description: Target End Date:  Prior to discharge or change in level of care    Document on Vitals flowsheet    1.  Document pain using the appropriate pain scale per order or unit policy  2.  Educate and implement non-pharmacologic comfort measures (i.e. relaxation, distraction, massage, cold/heat therapy, etc.)  3.  Pain management medications as ordered  4.  Reassess pain after pain med administration per policy  5.  If opiods administered assess patient's response to pain medication is appropriate per POSS sedation scale  6.  Follow pain management plan developed in collaboration with patient and interdisciplinary team (including palliative care or pain specialists if applicable)  Outcome: Progressing     Problem: Skin Integrity  Goal: Skin integrity is maintained or improved  Description: Target End Date:  Prior to discharge or change in level of care    Document interventions on Skin Risk/[place] flowsheet groups and corresponding LDA    1.  Assess and monitor skin integrity, appearance and/or temperature  2.  Assess risk factors for impaired skin integrity and/or pressures ulcers  3.  Implement precautions to protect skin integrity in collaboration with interdisciplinary team  4.  Implement pressure ulcer prevention protocol if at risk for skin breakdown  5.  Confirm wound care consult if at risk for skin breakdown  6.  Ensure patient use of pressure relieving devices  (Low air loss bed, waffle overlay, heel protectors, ROHO cushion, etc)  Outcome: Progressing     Problem: Fall Risk  Goal: Patient will remain free from falls  Description: Target End Date:  Prior to discharge or change in level of care    Document interventions on the Yessenia Sandoval Fall Risk Assessment    1.   Assess for fall risk factors  2.  Implement fall precautions  Outcome: Progressing     Problem: Self Care  Goal: Patient will have the ability to perform ADLs independently or with assistance (bathe, groom, dress, toilet and feed)  Description: Target End Date:  Prior to discharge or change in level of care    Document on ADL flowsheet    1.  Assess the capability and level of deficiency to perform ADLs  2.  Encourage family/care giver involvement  3.  Provide assistive devices  4.  Consider PT/OT evaluations  5.  Maintain support, give positive feedback, encourage self-care allowing extra time and verbal cuing as needed  6.  Avoid doing something for patients they can do themselves, but provide assistance as needed  7.  Assist in anticipating/planning individual needs  8.  Collaborate with Case Management and  to meet discharge needs  Outcome: Progressing     Problem: Risk for Aspiration  Goal: Patient's risk for aspiration will be absent or decrease  Description: Target End Date:  Prior to discharge or change in level of care    1.   Complete dysphagia screening on admission  2.   NPO until dysphagia screening complete or medically cleared  3.   Collaborate with Speech Therapy, Clinical Dietitian and interdisciplinary team  4.   Implement aspiration precautions  5.   Assist patient up to chair for meals  6.   Elevate head of bed 90 degrees if patient is unable to get out of bed  7.   Encourage small bites  8.   Ensure foods/liquids are of appropriate consistency  9.   Assess for any signs/symptoms of aspiration  10. Assess breath sounds and vital signs after oral intake  Outcome: Progressing     Problem: Urinary Elimination  Goal: Establish and maintain regular urinary output  Description: Target End Date:  Prior to discharge or change in level of care    Document on I/O and Assessment flowsheets    1.  Evaluate need to continue indwelling catheter every shift  2.  Assess signs and symptoms of  urinary retention  3.  Assess post-void residual volumes  4.  Implement bladder training program  5.  Encourage scheduled voidings  6.  Assist patient to sit on bedside commode or toilet for voiding  7.  Educate patient and family/caregiver on use and purpose of urine collection devices (document in Patient Education)  Outcome: Progressing     Problem: Bowel Elimination  Goal: Establish and maintain regular bowel function  Description: Target End Date:  Prior to discharge or change in level of care    1.   Note date of last BM  2.   Educate about diet, fluid intake, medication and activity to promote bowel function  3.   Educate signs and symptoms of constipation and interventions to implement  4.   Pharmacologic bowel management per provider order  5.   Regular toileting schedule  6.   Upright position for toileting  7.   High fiber diet  8.   Encourage hydration  9.   Collaborate with Clinical Dietician  10. Care and maintenance of ostomy if applicable  Outcome: Progressing     Problem: Respiratory  Goal: Patient will achieve/maintain optimum respiratory ventilation and gas exchange  Description: Target End Date:  Prior to discharge or change in level of care    Document on Assessment flowsheet    1.  Assess and monitor rate, rhythm, depth and effort of respiration  2.  Breath sounds assessed qshift and/or as needed  3.  Assess O2 saturation, administer/titrate oxygen as ordered  4.  Position patient for maximum ventilatory efficiency  5.  Turn, cough, and deep breath with splinting to improve effectiveness  6.  Collaborate with RT to administer medication/treatments per order  7.  Encourage use of incentive spirometer and encourage patient to cough after use and utilize splinting techniques if applicable  8.  Airway suctioning  9.  Monitor sputum production for changes in color, consistency and frequency  10. Perform frequent oral hygiene  11. Alternate physical activity with rest periods  Outcome: Progressing      Problem: Mobility  Goal: Patient's capacity to carry out activities will improve  Description: Target End Date:  Prior to discharge or change in level of care    1.  Assess for barriers to mobility/activity  2.  Implement activity per interdisciplinary team recommendations  3.  Target activity level identified and patient/family/caregiver aware of goal  4.  Provide assistive devices  5.  Instruct patient/caregiver on proper use of assistive/adaptive devices  6.  Schedule activities and rest periods to decrease effects of fatigue  7.  Encourage mobilization to extent of ability  8.  Maintain proper body alignment  9.  Provide adequate pain management to allow progressive mobilization  10. Implement pace maker precautions as needed  Outcome: Progressing       Patient is not progressing towards the following goals:      Problem: Neuro Status  Goal: Neuro status will remain stable or improve  Description: Target End Date:  Prior to discharge or change in level of care    Document on Neuro assessment in the Assessment flowsheet    1.  Assess and monitor neurologic status per provider order/protocol/unit policy  2.  Assess level of consciousness and orientation  3.  Assess for speech, dysarthria, dysphagia, facial symmetry  4.  Assess visual field, eye movements, gaze preference, pupil reaction and size  5.  Assess muscle strength and motor response in all four extremities  6.  Assess for sensation (numbness and tingling)  7.  Assess basic neuro reflexes (cough, gag, corneal)  8.  Identify changes in neuro status and report to provider for testing/treatment orders  Outcome: Not Progressing

## 2024-11-19 NOTE — PROGRESS NOTES
New replacement [facility] cervical collar pads have been delivered to patients room for patient staff to apply and fit to patients collar when ready.

## 2024-11-20 NOTE — PROGRESS NOTES
Neurosurgery Progress Note    Subjective:  ICPs > 20 last noc, given 23% x 1  Increased work of breathing  Now on paralytics, ICP 18      Exam:  Paralytics  Pupils pinpoint  EVD at zero cm- clear       BP  Min: 109/59  Max: 179/72  Pulse  Av.8  Min: 110  Max: 160  Resp  Av.4  Min: 9  Max: 38  Monitored Temp 2  Av.2 °C (100.7 °F)  Min: 36.6 °C (97.9 °F)  Max: 39.1 °C (102.4 °F)  SpO2  Av.6 %  Min: 90 %  Max: 100 %    ICP  Av.2 MM HG  Min: 11 MM HG  Max: 28 MM HG    Recent Labs     24  0728 24  0450 24  0336   WBC 11.4* 11.1* 7.7   RBC 2.98* 2.75* 3.01*   HEMOGLOBIN 8.1* 7.6* 8.2*   HEMATOCRIT 24.5* 22.8* 26.1*   MCV 82.2 82.9 86.7   MCH 27.2 27.6 27.2   MCHC 33.1 33.3 31.4*   RDW 44.7 47.3 51.1*   PLATELETCT 151* 163* 191   MPV 10.5 10.5 10.7     Recent Labs     24  0041 24  0336 24  0605   SODIUM 155* 160* 157*   POTASSIUM 3.7 4.3 4.1   CHLORIDE 125* 127* 126*   CO2 23 23 23   GLUCOSE 177* 192* 215*   BUN 23* 25* 26*   CREATININE 0.32* 0.40* 0.69   CALCIUM 8.3* 8.5 9.0                   Intake/Output                         24 0700 - 24 0659 24 07 - 24 0659      Total  Total                 Intake    P.O.  --  0 0  --  -- --    P.O. -- 0 0 -- -- --    I.V.  471.3  108 579.3  --  -- --    Fentanyl Volume 50.7 6.8 57.5 -- -- --    Propofol Volume 325.5 51.8 377.3 -- -- --    Volume (mL) (3% sodium chloride (Hypertonic Saline) 500mL infusion) 0.1 49.3 49.4 -- -- --    Volume (mL) (potassium chloride in water (Kcl) ivpb (ICU ONLY) 40 mEq) 95 -- 95 -- -- --    Other  120  -- 120  --  -- --    Medications (PO/Enteral Liquids) 120 -- 120 -- -- --    NG/GT  780  520 1300  --  -- --    Intake (mL) (Enteral Tube 24 Orogastric)  -- -- --    Enteral  120  220 340  --  -- --    Free Water / Tube Flush 120 90 210 -- -- --    Enteral Volume (Enteral Tube 24 Orogastric) -- 130 130 --  -- --    Total Intake 1491.3 848 2339.3 -- -- --       Output    Urine  1510  1225 2735  --  -- --    Output (mL) (Urethral Catheter Coude 12 Fr.) 1510 1225 2735 -- -- --    Drains  109  129 238  --  -- --    Output (mL) (ICP/Ventriculostomy Right) 109 129 238 -- -- --    Stool  --  -- --  --  -- --    Number of Times Stooled -- 0 x 0 x -- -- --    Chest Tube  40  20 60  --  -- --    Output (mL) (Chest Tube 1 Right Fourth intercostal space) 40 20 60 -- -- --    Total Output 1659 1374 3033 -- -- --       Net I/O     -167.7 -526 -693.8 -- -- --              Intake/Output Summary (Last 24 hours) at 11/20/2024 0742  Last data filed at 11/20/2024 0600  Gross per 24 hour   Intake 2339.25 ml   Output 3028 ml   Net -688.75 ml             midazolam  2 mg Q HOUR PRN    artificial tears  1 Application Q8HRS    vecuronium (Norcuron) 60 mg in dextrose 5% 500 mL Infusion  0-1.7 mcg/kg/min (Ideal) Continuous    vecuronium  0.1 mg/kg (Ideal) Q2HRS PRN    fentaNYL   Continuous    propofol  0-80 mcg/kg/min (Ideal) Continuous    fentaNYL  100 mcg Q HOUR PRN    baclofen  10 mg TID    cloNIDine  0.1 mg TWICE DAILY    sodium chloride  2 g Q8HRS    QUEtiapine  100 mg Q8HRS    Pharmacy Consult Request  1 Each PHARMACY TO DOSE    propranolol  10 mg Q8HRS    3% sodium chloride  0-60 mL/hr Continuous    sodium chloride 200 mEq in empty bag 50 mL infusion  200 mEq Q6HRS PRN    gabapentin  100 mg TID    acetaminophen  1,000 mg Q8HRS    metaxalone  800 mg TID    labetalol  10-20 mg Q4HRS PRN    hydrALAZINE  10-20 mg Q4HRS PRN    insulin lispro  1-6 Units Q6HRS    And    dextrose bolus  25 g Q15 MIN PRN    Pharmacy  1 Each PHARMACY TO DOSE    oxyCODONE immediate-release  5 mg Q3HRS PRN    Or    oxyCODONE immediate-release  10 mg Q3HRS PRN    acetaminophen  650 mg Q4HRS PRN    Or    acetaminophen  650 mg Q4HRS PRN    Respiratory Therapy Consult   Continuous RT    ondansetron  4 mg Q4HRS PRN    senna-docusate  1 Tablet Nightly    senna-docusate  1  Tablet Q24HRS PRN    polyethylene glycol/lytes  1 Packet BID    magnesium hydroxide  30 mL DAILY AT 1800    bisacodyl  10 mg Q24HRS PRN    sodium phosphate  1 Each Once PRN    famotidine  20 mg BID    levETIRAcetam  500 mg Q12HRS    ondansetron  4 mg Q4HRS PRN    docusate sodium  100 mg BID       Assessment and Plan:  Hospital day #4 severe chi, T1, T5, T12 fx  POD# 3 EVD placement  Chemical prophylactic DVT therapy: No  Start date/time: tbd     Brain Compression: Yes Traumatic    NAT neuro- on paralytics, pupils pinpoint  Keppra x7d  Target [id number], today 157 on 3% protocol/23%  Cont EVD at zero cm, Q 1 hr ICP, given 23% x 1 last noc for sustained > 20  Sbp<160  Collar for T1 fx  Vent per trauma     ATTENDING ADDENDUM:  agree with above note

## 2024-11-20 NOTE — CARE PLAN
The patient is Unstable - High likelihood or risk of patient condition declining or worsening    Shift Goals  Clinical Goals: Q1 neuros, ICP <20  Patient Goals: NAT  Family Goals: NAT    Progress made toward(s) clinical / shift goals:    Problem: Knowledge Deficit - Standard  Goal: Patient and family/care givers will demonstrate understanding of plan of care, disease process/condition, diagnostic tests and medications  Outcome: Progressing     Problem: Pain - Standard  Goal: Alleviation of pain or a reduction in pain to the patient’s comfort goal  Outcome: Progressing     Problem: Skin Integrity  Goal: Skin integrity is maintained or improved  Outcome: Progressing     Problem: Fall Risk  Goal: Patient will remain free from falls  Outcome: Progressing     Problem: Neuro Status  Goal: Neuro status will remain stable or improve  Outcome: Progressing     Problem: Mobility  Goal: Patient's capacity to carry out activities will improve  Outcome: Progressing

## 2024-11-20 NOTE — PROGRESS NOTES
RN notified MD of patient's agitation, increased work of breathing, and elevated ICP. One time dose of versed ordered and administered. MD at bedside and requested and update if versed not improving patient's status.

## 2024-11-20 NOTE — WOUND TEAM
Wound consult received for patient's sacrum, due to patients unstable ICPs, not appropriate to assess at this time. Wound team will round back at a later date.

## 2024-11-20 NOTE — CARE PLAN
The patient is Watcher - Medium risk of patient condition declining or worsening    Shift Goals  Clinical Goals: q1 neuros, icp monitoring  Patient Goals: neo  Family Goals: neo    Progress made toward(s) clinical / shift goals:    Problem: Knowledge Deficit - Standard  Goal: Patient and family/care givers will demonstrate understanding of plan of care, disease process/condition, diagnostic tests and medications  Description: Target End Date:  1-3 days or as soon as patient condition allows    Document in Patient Education    1.  Patient and family/caregiver oriented to unit, equipment, visitation policy and means for communicating concern  2.  Complete/review Learning Assessment  3.  Assess knowledge level of disease process/condition, treatment plan, diagnostic tests and medications  4.  Explain disease process/condition, treatment plan, diagnostic tests and medications  Outcome: Not Progressing     Problem: Neuro Status  Goal: Neuro status will remain stable or improve  Description: Target End Date:  Prior to discharge or change in level of care    Document on Neuro assessment in the Assessment flowsheet    1.  Assess and monitor neurologic status per provider order/protocol/unit policy  2.  Assess level of consciousness and orientation  3.  Assess for speech, dysarthria, dysphagia, facial symmetry  4.  Assess visual field, eye movements, gaze preference, pupil reaction and size  5.  Assess muscle strength and motor response in all four extremities  6.  Assess for sensation (numbness and tingling)  7.  Assess basic neuro reflexes (cough, gag, corneal)  8.  Identify changes in neuro status and report to provider for testing/treatment orders  Outcome: Not Progressing     Problem: Self Care  Goal: Patient will have the ability to perform ADLs independently or with assistance (bathe, groom, dress, toilet and feed)  Description: Target End Date:  Prior to discharge or change in level of care    Document on ADL  flowsheet    1.  Assess the capability and level of deficiency to perform ADLs  2.  Encourage family/care giver involvement  3.  Provide assistive devices  4.  Consider PT/OT evaluations  5.  Maintain support, give positive feedback, encourage self-care allowing extra time and verbal cuing as needed  6.  Avoid doing something for patients they can do themselves, but provide assistance as needed  7.  Assist in anticipating/planning individual needs  8.  Collaborate with Case Management and  to meet discharge needs  Outcome: Not Progressing     Problem: Safety - Medical Restraint  Goal: Remains free of injury from restraints (Restraint for Interference with Medical Device)  Description: INTERVENTIONS:  1. Determine that other, less restrictive measures have been tried or would not be effective before applying the restraint  2. Evaluate the patient's condition at the time of restraint application  3. Inform patient/family regarding the reason for restraint  4. Q2H: Monitor safety, nutrition and hydration  Outcome: Progressing  Goal: Free from restraint(s) (Restraint for Interference with Medical Device)  Description: INTERVENTIONS:  1.  ONCE/SHIFT or MINIMUM Q12H: Assess and document the continuing need for restraints  2.  Q24H: Continued use of restraint requires LIP to perform face to face examination and written order  3.  Identify and implement measures to help patient regain control  4.  Educate patient/family on discontinuation criteria   5.  Assess patient's understanding and retention of education provided  6.  Assess readiness for release & initiate progressive release per protocol  7.  Identify and document criteria for restraints  Outcome: Progressing     Problem: Pain - Standard  Goal: Alleviation of pain or a reduction in pain to the patient’s comfort goal  Description: Target End Date:  Prior to discharge or change in level of care    Document on Vitals flowsheet    1.  Document pain using  the appropriate pain scale per order or unit policy  2.  Educate and implement non-pharmacologic comfort measures (i.e. relaxation, distraction, massage, cold/heat therapy, etc.)  3.  Pain management medications as ordered  4.  Reassess pain after pain med administration per policy  5.  If opiods administered assess patient's response to pain medication is appropriate per POSS sedation scale  6.  Follow pain management plan developed in collaboration with patient and interdisciplinary team (including palliative care or pain specialists if applicable)  Outcome: Progressing     Problem: Skin Integrity  Goal: Skin integrity is maintained or improved  Description: Target End Date:  Prior to discharge or change in level of care    Document interventions on Skin Risk/[place] flowsheet groups and corresponding LDA    1.  Assess and monitor skin integrity, appearance and/or temperature  2.  Assess risk factors for impaired skin integrity and/or pressures ulcers  3.  Implement precautions to protect skin integrity in collaboration with interdisciplinary team  4.  Implement pressure ulcer prevention protocol if at risk for skin breakdown  5.  Confirm wound care consult if at risk for skin breakdown  6.  Ensure patient use of pressure relieving devices  (Low air loss bed, waffle overlay, heel protectors, ROHO cushion, etc)  Outcome: Progressing     Problem: Fall Risk  Goal: Patient will remain free from falls  Description: Target End Date:  Prior to discharge or change in level of care    Document interventions on the [place] Fall Risk Assessment    1.  Assess for fall risk factors  2.  Implement fall precautions  Outcome: Progressing     Problem: Risk for Aspiration  Goal: Patient's risk for aspiration will be absent or decrease  Description: Target End Date:  Prior to discharge or change in level of care    1.   Complete dysphagia screening on admission  2.   NPO until dysphagia screening complete or medically  cleared  3.   Collaborate with Speech Therapy, Clinical Dietitian and interdisciplinary team  4.   Implement aspiration precautions  5.   Assist patient up to chair for meals  6.   Elevate head of bed 90 degrees if patient is unable to get out of bed  7.   Encourage small bites  8.   Ensure foods/liquids are of appropriate consistency  9.   Assess for any signs/symptoms of aspiration  10. Assess breath sounds and vital signs after oral intake  Outcome: Progressing     Problem: Urinary Elimination  Goal: Establish and maintain regular urinary output  Description: Target End Date:  Prior to discharge or change in level of care    Document on I/O and Assessment flowsheets    1.  Evaluate need to continue indwelling catheter every shift  2.  Assess signs and symptoms of urinary retention  3.  Assess post-void residual volumes  4.  Implement bladder training program  5.  Encourage scheduled voidings  6.  Assist patient to sit on bedside commode or toilet for voiding  7.  Educate patient and family/caregiver on use and purpose of urine collection devices (document in Patient Education)  Outcome: Progressing     Problem: Bowel Elimination  Goal: Establish and maintain regular bowel function  Description: Target End Date:  Prior to discharge or change in level of care    1.   Note date of last BM  2.   Educate about diet, fluid intake, medication and activity to promote bowel function  3.   Educate signs and symptoms of constipation and interventions to implement  4.   Pharmacologic bowel management per provider order  5.   Regular toileting schedule  6.   Upright position for toileting  7.   High fiber diet  8.   Encourage hydration  9.   Collaborate with Clinical Dietician  10. Care and maintenance of ostomy if applicable  Outcome: Progressing     Problem: Respiratory  Goal: Patient will achieve/maintain optimum respiratory ventilation and gas exchange  Description: Target End Date:  Prior to discharge or change in level  of care    Document on Assessment flowsheet    1.  Assess and monitor rate, rhythm, depth and effort of respiration  2.  Breath sounds assessed qshift and/or as needed  3.  Assess O2 saturation, administer/titrate oxygen as ordered  4.  Position patient for maximum ventilatory efficiency  5.  Turn, cough, and deep breath with splinting to improve effectiveness  6.  Collaborate with RT to administer medication/treatments per order  7.  Encourage use of incentive spirometer and encourage patient to cough after use and utilize splinting techniques if applicable  8.  Airway suctioning  9.  Monitor sputum production for changes in color, consistency and frequency  10. Perform frequent oral hygiene  11. Alternate physical activity with rest periods  Outcome: Progressing     Problem: Mobility  Goal: Patient's capacity to carry out activities will improve  Description: Target End Date:  Prior to discharge or change in level of care    1.  Assess for barriers to mobility/activity  2.  Implement activity per interdisciplinary team recommendations  3.  Target activity level identified and patient/family/caregiver aware of goal  4.  Provide assistive devices  5.  Instruct patient/caregiver on proper use of assistive/adaptive devices  6.  Schedule activities and rest periods to decrease effects of fatigue  7.  Encourage mobilization to extent of ability  8.  Maintain proper body alignment  9.  Provide adequate pain management to allow progressive mobilization  10. Implement pace maker precautions as needed  Outcome: Progressing       Patient is not progressing towards the following goals:      Problem: Knowledge Deficit - Standard  Goal: Patient and family/care givers will demonstrate understanding of plan of care, disease process/condition, diagnostic tests and medications  Description: Target End Date:  1-3 days or as soon as patient condition allows    Document in Patient Education    1.  Patient and family/caregiver oriented  to unit, equipment, visitation policy and means for communicating concern  2.  Complete/review Learning Assessment  3.  Assess knowledge level of disease process/condition, treatment plan, diagnostic tests and medications  4.  Explain disease process/condition, treatment plan, diagnostic tests and medications  Outcome: Not Progressing     Problem: Neuro Status  Goal: Neuro status will remain stable or improve  Description: Target End Date:  Prior to discharge or change in level of care    Document on Neuro assessment in the Assessment flowsheet    1.  Assess and monitor neurologic status per provider order/protocol/unit policy  2.  Assess level of consciousness and orientation  3.  Assess for speech, dysarthria, dysphagia, facial symmetry  4.  Assess visual field, eye movements, gaze preference, pupil reaction and size  5.  Assess muscle strength and motor response in all four extremities  6.  Assess for sensation (numbness and tingling)  7.  Assess basic neuro reflexes (cough, gag, corneal)  8.  Identify changes in neuro status and report to provider for testing/treatment orders  Outcome: Not Progressing     Problem: Self Care  Goal: Patient will have the ability to perform ADLs independently or with assistance (bathe, groom, dress, toilet and feed)  Description: Target End Date:  Prior to discharge or change in level of care    Document on ADL flowsheet    1.  Assess the capability and level of deficiency to perform ADLs  2.  Encourage family/care giver involvement  3.  Provide assistive devices  4.  Consider PT/OT evaluations  5.  Maintain support, give positive feedback, encourage self-care allowing extra time and verbal cuing as needed  6.  Avoid doing something for patients they can do themselves, but provide assistance as needed  7.  Assist in anticipating/planning individual needs  8.  Collaborate with Case Management and  to meet discharge needs  Outcome: Not Progressing

## 2024-11-20 NOTE — PROGRESS NOTES
Skin prevalence team rounded on patient. Per bedside RN pt has unstable ICPs, is paralyzed and is too unstable for full skin check.

## 2024-11-20 NOTE — THERAPY
Speech Language Therapy Contact Note    Patient Name: Dejuan Aguilar  Age:  34 y.o., Sex:  male  Medical Record #: [id number]  Today's Date: 11/20/2024 11/20/24 0759   Treatment Variance   Reason For Missed Therapy Medical - Patient on Hold from Therapy;Medical - Patient not Able To Participate   Interdisciplinary Plan of Care Collaboration   IDT Collaboration with  Other (See Comments)  (EMR)   Collaboration Comments Vent day #6. Service will continue to monitor for extubation.

## 2024-11-20 NOTE — PROGRESS NOTES
0000: ICP's still elevated and continues to have increased work of breathing. MD notified, ordered prn versed q1h. Difficult to assess patient's neuro status q1hr due to patient's tolerance to sedation when paused.     0300: After 2 doses versed and oxycodone, patient continues to have increased work of breathing, chest xray completed, desatting to 86%, FiO2 increased to 70%. ICP's >20. RN notified MD. MD at bedside. Paralytics initiated. 23% administered.     0400: Neurosurg updated on pt status. Q1 pupil checks while pt is paralyzed.

## 2024-11-20 NOTE — PROGRESS NOTES
Neurosurgery updated for ICPs sustaining >20. 1200 Na resulted 156, no 23% can be given at this time. Per neurosurgery, bring Pco2 down to 30, currently 35. Patient currently febrile with cooling blanket, ice, and scheduled tylenol. Minimize fever, and continue monitoring ICPs. RT updated for Pco2 management and to do daily ABGs.

## 2024-11-20 NOTE — DISCHARGE PLANNING
Physiatry consult ordered by Dr. Waters. Patient remains sedated and intubated with vent support. No provider for post acute services. Consult is pending per protocol.

## 2024-11-20 NOTE — THERAPY
Occupational Therapy Contact Note    Attempted OT eval. Pt started on paralytics this AM as he continues to have elevated ICPs. Will hold and attempt as appropriate.     Aliyah Scott, OTR/L

## 2024-11-20 NOTE — CARE PLAN
Problem: Ventilation  Goal: Ability to achieve and maintain unassisted ventilation or tolerate decreased levels of ventilator support  Description: Target End Date:  4 days     Document on Vent flowsheet    1.  Support and monitor invasive and noninvasive mechanical ventilation  2.  Monitor ventilator weaning response  3.  Perform ventilator associated pneumonia prevention interventions  4.  Manage ventilation therapy by monitoring diagnostic test results  Outcome: Not Progressing                                    Ventilator Daily Summary    Vent Day #6     Airway: 7.5 ETT at 25     Ventilator settings: APV-CMV RR: 28 Vt: 400 PEEP: 12 FIO2: 90%    Weaning trials: none    Respiratory Procedures: none    Plan: Continue current ventilator settings and wean mechanical ventilation as tolerated per physician orders.

## 2024-11-20 NOTE — CARE PLAN
The patient is Watcher - Medium risk of patient condition declining or worsening    Shift Goals  Clinical Goals: q1 neuros, icp monitoring  Patient Goals: neo  Family Goals: neo    Progress made toward(s) clinical / shift goals:    Problem: Knowledge Deficit - Standard  Goal: Patient and family/care givers will demonstrate understanding of plan of care, disease process/condition, diagnostic tests and medications  Description: Target End Date:  1-3 days or as soon as patient condition allows    Document in Patient Education    1.  Patient and family/caregiver oriented to unit, equipment, visitation policy and means for communicating concern  2.  Complete/review Learning Assessment  3.  Assess knowledge level of disease process/condition, treatment plan, diagnostic tests and medications  4.  Explain disease process/condition, treatment plan, diagnostic tests and medications  11/20/2024 0835 by Holli Jordan R.N.  Outcome: Not Progressing  11/19/2024 2045 by Holli Jordan R.N.  Outcome: Not Progressing     Problem: Pain - Standard  Goal: Alleviation of pain or a reduction in pain to the patient’s comfort goal  Description: Target End Date:  Prior to discharge or change in level of care    Document on Vitals flowsheet    1.  Document pain using the appropriate pain scale per order or unit policy  2.  Educate and implement non-pharmacologic comfort measures (i.e. relaxation, distraction, massage, cold/heat therapy, etc.)  3.  Pain management medications as ordered  4.  Reassess pain after pain med administration per policy  5.  If opiods administered assess patient's response to pain medication is appropriate per POSS sedation scale  6.  Follow pain management plan developed in collaboration with patient and interdisciplinary team (including palliative care or pain specialists if applicable)  11/20/2024 0835 by Holli Jordan R.N.  Outcome: Not Progressing  11/19/2024 2045 by Holli Jordan R.N.  Outcome:  Progressing     Problem: Skin Integrity  Goal: Skin integrity is maintained or improved  Description: Target End Date:  Prior to discharge or change in level of care    Document interventions on Skin Risk/[place] flowsheet groups and corresponding LDA    1.  Assess and monitor skin integrity, appearance and/or temperature  2.  Assess risk factors for impaired skin integrity and/or pressures ulcers  3.  Implement precautions to protect skin integrity in collaboration with interdisciplinary team  4.  Implement pressure ulcer prevention protocol if at risk for skin breakdown  5.  Confirm wound care consult if at risk for skin breakdown  6.  Ensure patient use of pressure relieving devices  (Low air loss bed, waffle overlay, heel protectors, ROHO cushion, etc)  11/20/2024 0835 by Holli Jordan R.N.  Outcome: Not Progressing  11/19/2024 2045 by Holli Jordan R.N.  Outcome: Progressing     Problem: Fall Risk  Goal: Patient will remain free from falls  Description: Target End Date:  Prior to discharge or change in level of care    Document interventions on the [place] Fall Risk Assessment    1.  Assess for fall risk factors  2.  Implement fall precautions  11/20/2024 0835 by Holli Jordan R.N.  Outcome: Not Progressing  11/19/2024 2045 by Holli Jordan R.N.  Outcome: Progressing     Problem: Neuro Status  Goal: Neuro status will remain stable or improve  Description: Target End Date:  Prior to discharge or change in level of care    Document on Neuro assessment in the Assessment flowsheet    1.  Assess and monitor neurologic status per provider order/protocol/unit policy  2.  Assess level of consciousness and orientation  3.  Assess for speech, dysarthria, dysphagia, facial symmetry  4.  Assess visual field, eye movements, gaze preference, pupil reaction and size  5.  Assess muscle strength and motor response in all four extremities  6.  Assess for sensation (numbness and tingling)  7.  Assess basic neuro  reflexes (cough, gag, corneal)  8.  Identify changes in neuro status and report to provider for testing/treatment orders  11/20/2024 0835 by Holli Jordan R.N.  Outcome: Not Progressing  11/19/2024 2045 by Holli Jordan R.N.  Outcome: Not Progressing     Problem: Self Care  Goal: Patient will have the ability to perform ADLs independently or with assistance (bathe, groom, dress, toilet and feed)  Description: Target End Date:  Prior to discharge or change in level of care    Document on ADL flowsheet    1.  Assess the capability and level of deficiency to perform ADLs  2.  Encourage family/care giver involvement  3.  Provide assistive devices  4.  Consider PT/OT evaluations  5.  Maintain support, give positive feedback, encourage self-care allowing extra time and verbal cuing as needed  6.  Avoid doing something for patients they can do themselves, but provide assistance as needed  7.  Assist in anticipating/planning individual needs  8.  Collaborate with Case Management and  to meet discharge needs  11/20/2024 0835 by Holli Jordan R.N.  Outcome: Not Progressing  11/19/2024 2045 by Holli Jordan R.N.  Outcome: Not Progressing     Problem: Risk for Aspiration  Goal: Patient's risk for aspiration will be absent or decrease  Description: Target End Date:  Prior to discharge or change in level of care    1.   Complete dysphagia screening on admission  2.   NPO until dysphagia screening complete or medically cleared  3.   Collaborate with Speech Therapy, Clinical Dietitian and interdisciplinary team  4.   Implement aspiration precautions  5.   Assist patient up to chair for meals  6.   Elevate head of bed 90 degrees if patient is unable to get out of bed  7.   Encourage small bites  8.   Ensure foods/liquids are of appropriate consistency  9.   Assess for any signs/symptoms of aspiration  10. Assess breath sounds and vital signs after oral intake  11/20/2024 0835 by Holli Jordan  LISSA.  Outcome: Not Progressing  11/19/2024 2045 by Holli Jordan R.N.  Outcome: Progressing     Problem: Urinary Elimination  Goal: Establish and maintain regular urinary output  Description: Target End Date:  Prior to discharge or change in level of care    Document on I/O and Assessment flowsheets    1.  Evaluate need to continue indwelling catheter every shift  2.  Assess signs and symptoms of urinary retention  3.  Assess post-void residual volumes  4.  Implement bladder training program  5.  Encourage scheduled voidings  6.  Assist patient to sit on bedside commode or toilet for voiding  7.  Educate patient and family/caregiver on use and purpose of urine collection devices (document in Patient Education)  11/20/2024 0835 by Holli Jordan R.N.  Outcome: Not Progressing  11/19/2024 2045 by Holli Jordan R.N.  Outcome: Progressing     Problem: Bowel Elimination  Goal: Establish and maintain regular bowel function  Description: Target End Date:  Prior to discharge or change in level of care    1.   Note date of last BM  2.   Educate about diet, fluid intake, medication and activity to promote bowel function  3.   Educate signs and symptoms of constipation and interventions to implement  4.   Pharmacologic bowel management per provider order  5.   Regular toileting schedule  6.   Upright position for toileting  7.   High fiber diet  8.   Encourage hydration  9.   Collaborate with Clinical Dietician  10. Care and maintenance of ostomy if applicable  11/20/2024 0835 by Holli Jordan R.N.  Outcome: Not Progressing  11/19/2024 2045 by Holli Jordan R.N.  Outcome: Progressing     Problem: Respiratory  Goal: Patient will achieve/maintain optimum respiratory ventilation and gas exchange  Description: Target End Date:  Prior to discharge or change in level of care    Document on Assessment flowsheet    1.  Assess and monitor rate, rhythm, depth and effort of respiration  2.  Breath sounds assessed qshift  and/or as needed  3.  Assess O2 saturation, administer/titrate oxygen as ordered  4.  Position patient for maximum ventilatory efficiency  5.  Turn, cough, and deep breath with splinting to improve effectiveness  6.  Collaborate with RT to administer medication/treatments per order  7.  Encourage use of incentive spirometer and encourage patient to cough after use and utilize splinting techniques if applicable  8.  Airway suctioning  9.  Monitor sputum production for changes in color, consistency and frequency  10. Perform frequent oral hygiene  11. Alternate physical activity with rest periods  11/20/2024 0835 by Holli Jordan R.N.  Outcome: Not Progressing  11/19/2024 2045 by Holli Jordan R.N.  Outcome: Progressing     Problem: Mobility  Goal: Patient's capacity to carry out activities will improve  Description: Target End Date:  Prior to discharge or change in level of care    1.  Assess for barriers to mobility/activity  2.  Implement activity per interdisciplinary team recommendations  3.  Target activity level identified and patient/family/caregiver aware of goal  4.  Provide assistive devices  5.  Instruct patient/caregiver on proper use of assistive/adaptive devices  6.  Schedule activities and rest periods to decrease effects of fatigue  7.  Encourage mobilization to extent of ability  8.  Maintain proper body alignment  9.  Provide adequate pain management to allow progressive mobilization  10. Implement pace maker precautions as needed  11/20/2024 0835 by SONIA HidalgoN.  Outcome: Not Progressing  11/19/2024 2045 by Holli Jordan R.N.  Outcome: Progressing       Patient is not progressing towards the following goals:      Problem: Knowledge Deficit - Standard  Goal: Patient and family/care givers will demonstrate understanding of plan of care, disease process/condition, diagnostic tests and medications  Description: Target End Date:  1-3 days or as soon as patient condition  allows    Document in Patient Education    1.  Patient and family/caregiver oriented to unit, equipment, visitation policy and means for communicating concern  2.  Complete/review Learning Assessment  3.  Assess knowledge level of disease process/condition, treatment plan, diagnostic tests and medications  4.  Explain disease process/condition, treatment plan, diagnostic tests and medications  11/20/2024 0835 by Holli Jordan R.N.  Outcome: Not Progressing  11/19/2024 2045 by Holli Jordan R.N.  Outcome: Not Progressing     Problem: Pain - Standard  Goal: Alleviation of pain or a reduction in pain to the patient’s comfort goal  Description: Target End Date:  Prior to discharge or change in level of care    Document on Vitals flowsheet    1.  Document pain using the appropriate pain scale per order or unit policy  2.  Educate and implement non-pharmacologic comfort measures (i.e. relaxation, distraction, massage, cold/heat therapy, etc.)  3.  Pain management medications as ordered  4.  Reassess pain after pain med administration per policy  5.  If opiods administered assess patient's response to pain medication is appropriate per POSS sedation scale  6.  Follow pain management plan developed in collaboration with patient and interdisciplinary team (including palliative care or pain specialists if applicable)  11/20/2024 0835 by Holli Jordan R.N.  Outcome: Not Progressing  11/19/2024 2045 by Holli Jordan R.N.  Outcome: Progressing     Problem: Skin Integrity  Goal: Skin integrity is maintained or improved  Description: Target End Date:  Prior to discharge or change in level of care    Document interventions on Skin Risk/[place] flowsheet groups and corresponding LDA    1.  Assess and monitor skin integrity, appearance and/or temperature  2.  Assess risk factors for impaired skin integrity and/or pressures ulcers  3.  Implement precautions to protect skin integrity in collaboration with interdisciplinary  team  4.  Implement pressure ulcer prevention protocol if at risk for skin breakdown  5.  Confirm wound care consult if at risk for skin breakdown  6.  Ensure patient use of pressure relieving devices  (Low air loss bed, waffle overlay, heel protectors, ROHO cushion, etc)  11/20/2024 0835 by Holli Jordan R.N.  Outcome: Not Progressing  11/19/2024 2045 by Holli Jordan R.N.  Outcome: Progressing     Problem: Fall Risk  Goal: Patient will remain free from falls  Description: Target End Date:  Prior to discharge or change in level of care    Document interventions on the [facility] Fall Risk Assessment    1.  Assess for fall risk factors  2.  Implement fall precautions  11/20/2024 0835 by Holli Jordan R.N.  Outcome: Not Progressing  11/19/2024 2045 by Holli Jordan R.N.  Outcome: Progressing     Problem: Neuro Status  Goal: Neuro status will remain stable or improve  Description: Target End Date:  Prior to discharge or change in level of care    Document on Neuro assessment in the Assessment flowsheet    1.  Assess and monitor neurologic status per provider order/protocol/unit policy  2.  Assess level of consciousness and orientation  3.  Assess for speech, dysarthria, dysphagia, facial symmetry  4.  Assess visual field, eye movements, gaze preference, pupil reaction and size  5.  Assess muscle strength and motor response in all four extremities  6.  Assess for sensation (numbness and tingling)  7.  Assess basic neuro reflexes (cough, gag, corneal)  8.  Identify changes in neuro status and report to provider for testing/treatment orders  11/20/2024 0835 by Holli Jordan R.N.  Outcome: Not Progressing  11/19/2024 2045 by Holli Jordan R.N.  Outcome: Not Progressing     Problem: Self Care  Goal: Patient will have the ability to perform ADLs independently or with assistance (bathe, groom, dress, toilet and feed)  Description: Target End Date:  Prior to discharge or change in level of care    Document  on ADL flowsheet    1.  Assess the capability and level of deficiency to perform ADLs  2.  Encourage family/care giver involvement  3.  Provide assistive devices  4.  Consider PT/OT evaluations  5.  Maintain support, give positive feedback, encourage self-care allowing extra time and verbal cuing as needed  6.  Avoid doing something for patients they can do themselves, but provide assistance as needed  7.  Assist in anticipating/planning individual needs  8.  Collaborate with Case Management and  to meet discharge needs  11/20/2024 0835 by Holli Jordan R.N.  Outcome: Not Progressing  11/19/2024 2045 by Holli Jordan R.N.  Outcome: Not Progressing     Problem: Risk for Aspiration  Goal: Patient's risk for aspiration will be absent or decrease  Description: Target End Date:  Prior to discharge or change in level of care    1.   Complete dysphagia screening on admission  2.   NPO until dysphagia screening complete or medically cleared  3.   Collaborate with Speech Therapy, Clinical Dietitian and interdisciplinary team  4.   Implement aspiration precautions  5.   Assist patient up to chair for meals  6.   Elevate head of bed 90 degrees if patient is unable to get out of bed  7.   Encourage small bites  8.   Ensure foods/liquids are of appropriate consistency  9.   Assess for any signs/symptoms of aspiration  10. Assess breath sounds and vital signs after oral intake  11/20/2024 0835 by SONIA HidalgoN.  Outcome: Not Progressing  11/19/2024 2045 by Holli Jordan R.N.  Outcome: Progressing     Problem: Urinary Elimination  Goal: Establish and maintain regular urinary output  Description: Target End Date:  Prior to discharge or change in level of care    Document on I/O and Assessment flowsheets    1.  Evaluate need to continue indwelling catheter every shift  2.  Assess signs and symptoms of urinary retention  3.  Assess post-void residual volumes  4.  Implement bladder training program  5.   Encourage scheduled voidings  6.  Assist patient to sit on bedside commode or toilet for voiding  7.  Educate patient and family/caregiver on use and purpose of urine collection devices (document in Patient Education)  11/20/2024 0835 by Holli Jordan R.N.  Outcome: Not Progressing  11/19/2024 2045 by Holli Jordan R.N.  Outcome: Progressing     Problem: Bowel Elimination  Goal: Establish and maintain regular bowel function  Description: Target End Date:  Prior to discharge or change in level of care    1.   Note date of last BM  2.   Educate about diet, fluid intake, medication and activity to promote bowel function  3.   Educate signs and symptoms of constipation and interventions to implement  4.   Pharmacologic bowel management per provider order  5.   Regular toileting schedule  6.   Upright position for toileting  7.   High fiber diet  8.   Encourage hydration  9.   Collaborate with Clinical Dietician  10. Care and maintenance of ostomy if applicable  11/20/2024 0835 by Holli Jordan R.N.  Outcome: Not Progressing  11/19/2024 2045 by Holli Jordan R.N.  Outcome: Progressing     Problem: Respiratory  Goal: Patient will achieve/maintain optimum respiratory ventilation and gas exchange  Description: Target End Date:  Prior to discharge or change in level of care    Document on Assessment flowsheet    1.  Assess and monitor rate, rhythm, depth and effort of respiration  2.  Breath sounds assessed qshift and/or as needed  3.  Assess O2 saturation, administer/titrate oxygen as ordered  4.  Position patient for maximum ventilatory efficiency  5.  Turn, cough, and deep breath with splinting to improve effectiveness  6.  Collaborate with RT to administer medication/treatments per order  7.  Encourage use of incentive spirometer and encourage patient to cough after use and utilize splinting techniques if applicable  8.  Airway suctioning  9.  Monitor sputum production for changes in color, consistency and  frequency  10. Perform frequent oral hygiene  11. Alternate physical activity with rest periods  11/20/2024 0835 by Holli Jordan R.N.  Outcome: Not Progressing  11/19/2024 2045 by Holli Jordan R.N.  Outcome: Progressing     Problem: Mobility  Goal: Patient's capacity to carry out activities will improve  Description: Target End Date:  Prior to discharge or change in level of care    1.  Assess for barriers to mobility/activity  2.  Implement activity per interdisciplinary team recommendations  3.  Target activity level identified and patient/family/caregiver aware of goal  4.  Provide assistive devices  5.  Instruct patient/caregiver on proper use of assistive/adaptive devices  6.  Schedule activities and rest periods to decrease effects of fatigue  7.  Encourage mobilization to extent of ability  8.  Maintain proper body alignment  9.  Provide adequate pain management to allow progressive mobilization  10. Implement pace maker precautions as needed  11/20/2024 0835 by SONIA HidalgoN.  Outcome: Not Progressing  11/19/2024 2045 by Holli Jordan R.N.  Outcome: Progressing

## 2024-11-20 NOTE — CONSULTS
Referral # [id number]    Please call [phone] to contact the on-call coordinator with any updates including significant changes in neuro status, patient condition, plans for brain death declarations, or plans for end of life discussions.     Date: 11/19/24    Thank you for the referral of this patient. A chart review has been completed to determine suitability for organ and tissue donation.    *Donation is an option:  -Upon meeting the criteria for brain death this patient will be a potential candidate for organ donation, upon further evaluation.  -This patient may meet the criteria for DCD (donation after circulatory death). Further evaluation will be needed should the family decide to transition to comfort care.      *[facility] will continue to follow this case.  Organ donation should not be mentioned to the family. Discussion of organ donation should be a planned, one-time coordinated event in collaboration with [facility].        Thank you for your continued support of organ and tissue donation.

## 2024-11-20 NOTE — PROGRESS NOTES
0945 Discussed with  concerns of patient being inadequately paralyzed. TOF 0/4, but patient still has cough reflex. Orders received to increase paralytic rate, see MAR for titrations.    1200 TOF 0/4, cough reflex no longer present. Updated MD of new neuro status. Per MD, keep paralytic at set rate for the rest of the day until 11/21 rounds. Change TOF to q4 checks.
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
## 2024-11-21 NOTE — CARE PLAN
The patient is Watcher - Medium risk of patient condition declining or worsening    Shift Goals  Clinical Goals: stable ICPs, [id number], continue paralytic at fixed rate, ventilator compliance.  Patient Goals: neo  Family Goals: neo      Problem: Knowledge Deficit - Standard  Goal: Patient and family/care givers will demonstrate understanding of plan of care, disease process/condition, diagnostic tests and medications  Outcome: Progressing     Problem: Pain - Standard  Goal: Alleviation of pain or a reduction in pain to the patient’s comfort goal  Outcome: Progressing
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
## 2024-11-21 NOTE — CARE PLAN
Problem: Ventilation  Goal: Ability to achieve and maintain unassisted ventilation or tolerate decreased levels of ventilator support  Description: Target End Date:  4 days     Document on Vent flowsheet    1.  Support and monitor invasive and noninvasive mechanical ventilation  2.  Monitor ventilator weaning response  3.  Perform ventilator associated pneumonia prevention interventions  4.  Manage ventilation therapy by monitoring diagnostic test results  Outcome: Not Progressing                                              Ventilator Daily Summary    Vent Day #7     Airway: 7.5 ETT at 25    Ventilator settings: APV-CMV RR: 34 Vt: 400 PEEP: 10 FiO2: 100    Weaning trials: none    Respiratory Procedures:  Manual mechanical ventilation due to code blue.  mode of ventilation changed post code blue    Plan: Continue current ventilator settings and wean mechanical ventilation as tolerated per physician orders.
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
## 2024-11-21 NOTE — PROGRESS NOTES
Patient was having desaturation event and could not maintain normal oxygen saturations. MD, RN. RT at bedside. Patient went into a bradycardic rhythm and then to asystole. Pulses were lost, CPR started at 0639 and code blue activiated. See code narrator for details. TOD 0648

## 2024-11-26 LAB
BASE EXCESS BLDA CALC-SCNC: -6 MMOL/L (ref -4–3)
BODY TEMPERATURE: ABNORMAL DEGREES
CO2 BLDA-SCNC: 20 MMOL/L (ref 20–33)
DELSYS IDSYS: ABNORMAL
END TIDAL CARBON DIOXIDE IECO2: 38 MMHG
HCO3 BLDA-SCNC: 19.2 MMOL/L (ref 17–25)
HOROWITZ INDEX BLDA+IHG-RTO: 185 MM[HG]
LACTATE BLD-SCNC: 0.7 MMOL/L (ref 0.5–2)
MODE IMODE: ABNORMAL
O2/TOTAL GAS SETTING VFR VENT: 40 %
PCO2 BLDA: 35.4 MMHG (ref 26–37)
PCO2 TEMP ADJ BLDA: 37.4 MMHG (ref 26–37)
PEEP END EXPIRATORY PRESSURE IPEEP: 8 CMH20
PERCENT MINUTE VOLUME IPMV: 120
PH BLDA: 7.34 [PH] (ref 7.4–7.5)
PH TEMP ADJ BLDA: 7.33 [PH] (ref 7.4–7.5)
PO2 BLDA: 74 MMHG (ref 64–87)
PO2 TEMP ADJ BLDA: 81 MMHG (ref 64–87)
SAO2 % BLDA: 94 % (ref 93–99)
SPECIMEN DRAWN FROM PATIENT: ABNORMAL